# Patient Record
Sex: MALE | Race: BLACK OR AFRICAN AMERICAN | NOT HISPANIC OR LATINO
[De-identification: names, ages, dates, MRNs, and addresses within clinical notes are randomized per-mention and may not be internally consistent; named-entity substitution may affect disease eponyms.]

---

## 2021-07-08 PROBLEM — Z00.00 ENCOUNTER FOR PREVENTIVE HEALTH EXAMINATION: Status: ACTIVE | Noted: 2021-07-08

## 2021-07-12 ENCOUNTER — APPOINTMENT (OUTPATIENT)
Dept: SURGERY | Facility: CLINIC | Age: 46
End: 2021-07-12
Payer: COMMERCIAL

## 2021-07-12 VITALS
SYSTOLIC BLOOD PRESSURE: 164 MMHG | OXYGEN SATURATION: 100 % | DIASTOLIC BLOOD PRESSURE: 120 MMHG | HEART RATE: 76 BPM | WEIGHT: 172 LBS | BODY MASS INDEX: 26.07 KG/M2 | HEIGHT: 68 IN

## 2021-07-12 DIAGNOSIS — Z78.9 OTHER SPECIFIED HEALTH STATUS: ICD-10-CM

## 2021-07-12 DIAGNOSIS — Z87.891 PERSONAL HISTORY OF NICOTINE DEPENDENCE: ICD-10-CM

## 2021-07-12 DIAGNOSIS — K46.9 UNSPECIFIED ABDOMINAL HERNIA W/OUT OBSTRUCTION OR GANGRENE: ICD-10-CM

## 2021-07-12 LAB
ALBUMIN SERPL ELPH-MCNC: 5 G/DL
ALP BLD-CCNC: 68 U/L
ALT SERPL-CCNC: 31 U/L
ANION GAP SERPL CALC-SCNC: 12 MMOL/L
AST SERPL-CCNC: 32 U/L
BASOPHILS # BLD AUTO: 0.04 K/UL
BASOPHILS NFR BLD AUTO: 0.7 %
BILIRUB SERPL-MCNC: 0.4 MG/DL
BUN SERPL-MCNC: 13 MG/DL
CALCIUM SERPL-MCNC: 9.9 MG/DL
CHLORIDE SERPL-SCNC: 103 MMOL/L
CO2 SERPL-SCNC: 26 MMOL/L
CREAT SERPL-MCNC: 0.93 MG/DL
EOSINOPHIL # BLD AUTO: 0.07 K/UL
EOSINOPHIL NFR BLD AUTO: 1.3 %
GLUCOSE SERPL-MCNC: 88 MG/DL
HCT VFR BLD CALC: 45.2 %
HGB BLD-MCNC: 14.7 G/DL
IMM GRANULOCYTES NFR BLD AUTO: 0.2 %
LYMPHOCYTES # BLD AUTO: 1.83 K/UL
LYMPHOCYTES NFR BLD AUTO: 32.7 %
MAN DIFF?: NORMAL
MCHC RBC-ENTMCNC: 30.9 PG
MCHC RBC-ENTMCNC: 32.5 GM/DL
MCV RBC AUTO: 95.2 FL
MONOCYTES # BLD AUTO: 0.38 K/UL
MONOCYTES NFR BLD AUTO: 6.8 %
NEUTROPHILS # BLD AUTO: 3.27 K/UL
NEUTROPHILS NFR BLD AUTO: 58.3 %
PLATELET # BLD AUTO: 342 K/UL
POTASSIUM SERPL-SCNC: 4.6 MMOL/L
PROT SERPL-MCNC: 7.8 G/DL
RBC # BLD: 4.75 M/UL
RBC # FLD: 13.2 %
SODIUM SERPL-SCNC: 142 MMOL/L
WBC # FLD AUTO: 5.6 K/UL

## 2021-07-12 PROCEDURE — 99204 OFFICE O/P NEW MOD 45 MIN: CPT

## 2021-07-12 PROCEDURE — 99072 ADDL SUPL MATRL&STAF TM PHE: CPT

## 2021-07-12 NOTE — REASON FOR VISIT
[Consultation] : a consultation visit [Parent] : parent [FreeTextEntry1] : discomfort, right side of groin /R. Inguinal Hernia

## 2021-07-12 NOTE — HISTORY OF PRESENT ILLNESS
[de-identified] : MARK DAS is a 45 year old M who is referred to the office for consultation visit, he presents w the cc of having discomfort to the right side of groin, which has been getting worse >1 year. He feels a bulge that comes and goes. Patient states he wears a belt to the pelvic area, which helps with the discomfort. He admits to heavy lifting at times, as part of his work.

## 2021-07-12 NOTE — ASSESSMENT
[FreeTextEntry1] : Patient is a 46 y/o M who presents with cc discomfort/bulge to the right side of groin >1 year; on exam, he was found to have a right inguinal hernia.

## 2021-07-12 NOTE — REVIEW OF SYSTEMS
[Fever] : no fever [Chills] : no chills [Feeling Poorly] : not feeling poorly [Chest Pain] : no chest pain [Lower Ext Edema] : no extremity edema [Shortness Of Breath] : no shortness of breath [Cough] : no cough [Abdominal Pain] : no abdominal pain [Genital Lesion] : no genital lesions [Testicular Pain] : no testicular pain [Arthralgias] : no arthralgias [Joint Pain] : no joint pain [Skin Lesions] : no skin lesions [Skin Wound] : no skin wound [Dizziness] : no dizziness [Anxiety] : no anxiety [Muscle Weakness] : no muscle weakness [Swollen Glands] : no swollen glands [FreeTextEntry8] : discomfort, right side of groin x >1 year; bulge that comes and goes and getting larger

## 2021-07-12 NOTE — CONSULT LETTER
[Dear  ___] : Dear  [unfilled], [Consult Letter:] : I had the pleasure of evaluating your patient, [unfilled]. [Consult Closing:] : Thank you very much for allowing me to participate in the care of this patient.  If you have any questions, please do not hesitate to contact me. [Sincerely,] : Sincerely, [FreeTextEntry3] : Amado Rueda MD\par

## 2021-07-12 NOTE — PHYSICAL EXAM
[Normal Breath Sounds] : Normal breath sounds [Normal Rate and Rhythm] : normal rate and rhythm [No Rash or Lesion] : No rash or lesion [Alert] : alert [Oriented to Person] : oriented to person [Oriented to Place] : oriented to place [Oriented to Time] : oriented to time [Calm] : calm [JVD] : no jugular venous distention  [de-identified] : A/Ox3; NAD. appears comfortable [de-identified] : EOMI; sclera anicteric. Nasal mucosa pink, septum midline. Oral mucosa pink. Tongue midline, Pharynx without exudates. [de-identified] : abd is soft, NT/ND\par  [de-identified] : No penile discharge or lesions. No scrotal swelling or discoloration. Testes descended bilaterally, smooth, without masses. Epididymis non-tender. Reducible, Right Inguinal Hernia  [de-identified] : +ROM, no joint swelling

## 2021-07-23 ENCOUNTER — OUTPATIENT (OUTPATIENT)
Dept: OUTPATIENT SERVICES | Facility: HOSPITAL | Age: 46
LOS: 1 days | End: 2021-07-23
Payer: COMMERCIAL

## 2021-07-23 VITALS
RESPIRATION RATE: 16 BRPM | HEIGHT: 69 IN | WEIGHT: 167.99 LBS | DIASTOLIC BLOOD PRESSURE: 88 MMHG | TEMPERATURE: 98 F | SYSTOLIC BLOOD PRESSURE: 148 MMHG | HEART RATE: 65 BPM | OXYGEN SATURATION: 100 %

## 2021-07-23 DIAGNOSIS — K40.90 UNILATERAL INGUINAL HERNIA, WITHOUT OBSTRUCTION OR GANGRENE, NOT SPECIFIED AS RECURRENT: ICD-10-CM

## 2021-07-23 DIAGNOSIS — Z29.9 ENCOUNTER FOR PROPHYLACTIC MEASURES, UNSPECIFIED: ICD-10-CM

## 2021-07-23 DIAGNOSIS — Z01.818 ENCOUNTER FOR OTHER PREPROCEDURAL EXAMINATION: ICD-10-CM

## 2021-07-23 DIAGNOSIS — T14.8XXA OTHER INJURY OF UNSPECIFIED BODY REGION, INITIAL ENCOUNTER: Chronic | ICD-10-CM

## 2021-07-23 PROCEDURE — G0463: CPT

## 2021-07-23 NOTE — H&P PST ADULT - NSANTHOSAYNRD_GEN_A_CORE
No. KIRIT screening performed.  STOP BANG Legend: 0-2 = LOW Risk; 3-4 = INTERMEDIATE Risk; 5-8 = HIGH Risk

## 2021-07-23 NOTE — H&P PST ADULT - HISTORY OF PRESENT ILLNESS
46 yo male with no significant PMHx, reports the above.  Patient reports pain in right groin "if not wearing the belt".  He is scheduled for : Right Inguinal Hernia Repair with Mesh, on 7/28/21

## 2021-07-23 NOTE — H&P PST ADULT - NSANTHOBSERVEDRD_ENT_A_CORE
CRRT STATUS NOTE    DATA:  Time:  6:44 PM  Pressures WNL:  YES  Filter Status:  WDL    Problems Reported/Alarms Noted:  none    Supplies Present:  YES    ASSESSMENT:  Patient Net Fluid Balance:  -467 @1800  Vital Signs:  B/P: 130/78, MAPs 80s-90s on LVAD, T: 97.2, P: , R: 22  Labs:  K 3.7, Mg 2.2, Phos 3.8, ICA 4.8, Hgb 7.8, Plt 214  Goals of Therapy:  50-100cc/hr net negative as BP's allow - meeting goals of therapy    INTERVENTIONS:   TMP limit reached, restarted postponed 2/2 line placement, LIJ placed - MDs verified placement via xray. CRRT restarted.    PLAN:  Continue fluid removal as tolerated per goals of therapy.  Check filter daily.  Change filter q 72 hrs and PRN.  Please contact the CRRT resource RN at [phone] with any questions/concerns.     No

## 2021-07-23 NOTE — H&P PST ADULT - NSICDXPROBLEM_GEN_ALL_CORE_FT
PROBLEM DIAGNOSES  Problem: Unilateral inguinal hernia, without obstruction or gangrene, not specified as recurrent  Assessment and Plan: Right Inguinal Hernia Repair with Mesh    Problem: Need for prophylactic measure  Assessment and Plan: Instruction regarding chlorhexidine soap use is given.  Patient verbalized understanding.   Literature also given

## 2021-07-23 NOTE — H&P PST ADULT - DOCUMENT STATUS
Dialysis Adherence:    SW received a faxed adherence form from 's dialysis center indicates over last three months that the patient has had 1 AMAs of 19 minutes on 05/05/2020, 1 no-shows on 5/14 due to illness, and no issues with caregivers, transportation, or any other psychosocial concerns.          Form also indicates:    Last intact PTH from 06/09/2020 is reported as 55.    Current dry weight is reported as 92.5kg and Most Recent Pre-treatment weight is reported as 96.5kg on 07/07/2020.    
Authored by Resident/PA/NP

## 2021-07-24 DIAGNOSIS — Z01.818 ENCOUNTER FOR OTHER PREPROCEDURAL EXAMINATION: ICD-10-CM

## 2021-07-25 ENCOUNTER — APPOINTMENT (OUTPATIENT)
Dept: DISASTER EMERGENCY | Facility: CLINIC | Age: 46
End: 2021-07-25

## 2021-07-26 LAB — SARS-COV-2 N GENE NPH QL NAA+PROBE: NOT DETECTED

## 2021-07-27 ENCOUNTER — TRANSCRIPTION ENCOUNTER (OUTPATIENT)
Age: 46
End: 2021-07-27

## 2021-07-28 ENCOUNTER — INPATIENT (INPATIENT)
Facility: HOSPITAL | Age: 46
LOS: 1 days | Discharge: HOME | DRG: 350 | End: 2021-07-30
Attending: INTERNAL MEDICINE | Admitting: INTERNAL MEDICINE
Payer: COMMERCIAL

## 2021-07-28 ENCOUNTER — RESULT REVIEW (OUTPATIENT)
Age: 46
End: 2021-07-28

## 2021-07-28 ENCOUNTER — APPOINTMENT (OUTPATIENT)
Dept: SURGERY | Facility: HOSPITAL | Age: 46
End: 2021-07-28

## 2021-07-28 VITALS
DIASTOLIC BLOOD PRESSURE: 75 MMHG | TEMPERATURE: 99 F | OXYGEN SATURATION: 100 % | HEART RATE: 71 BPM | SYSTOLIC BLOOD PRESSURE: 126 MMHG | RESPIRATION RATE: 16 BRPM | HEIGHT: 69 IN | WEIGHT: 167.99 LBS

## 2021-07-28 DIAGNOSIS — K40.90 UNILATERAL INGUINAL HERNIA, WITHOUT OBSTRUCTION OR GANGRENE, NOT SPECIFIED AS RECURRENT: ICD-10-CM

## 2021-07-28 DIAGNOSIS — T14.8XXA OTHER INJURY OF UNSPECIFIED BODY REGION, INITIAL ENCOUNTER: Chronic | ICD-10-CM

## 2021-07-28 LAB
ALBUMIN SERPL ELPH-MCNC: 2.6 G/DL — LOW (ref 3.5–5)
ALP SERPL-CCNC: 37 U/L — LOW (ref 40–120)
ALT FLD-CCNC: 22 U/L DA — SIGNIFICANT CHANGE UP (ref 10–60)
ANION GAP SERPL CALC-SCNC: 7 MMOL/L — SIGNIFICANT CHANGE UP (ref 5–17)
ANION GAP SERPL CALC-SCNC: 9 MMOL/L — SIGNIFICANT CHANGE UP (ref 5–17)
APTT BLD: 27.7 SEC — SIGNIFICANT CHANGE UP (ref 27.5–35.5)
AST SERPL-CCNC: 23 U/L — SIGNIFICANT CHANGE UP (ref 10–40)
BASE EXCESS BLDA CALC-SCNC: -4.8 MMOL/L — LOW (ref -2–3)
BASE EXCESS BLDA CALC-SCNC: -7.1 MMOL/L — LOW (ref -2–3)
BASE EXCESS BLDA CALC-SCNC: -8.1 MMOL/L — LOW (ref -2–3)
BASE EXCESS BLDA CALC-SCNC: -8.6 MMOL/L — LOW (ref -2–3)
BASE EXCESS BLDV CALC-SCNC: -4.3 MMOL/L — SIGNIFICANT CHANGE UP
BILIRUB SERPL-MCNC: 0.3 MG/DL — SIGNIFICANT CHANGE UP (ref 0.2–1.2)
BLOOD GAS COMMENTS ARTERIAL: SIGNIFICANT CHANGE UP
BLOOD GAS COMMENTS, VENOUS: SIGNIFICANT CHANGE UP
BUN SERPL-MCNC: 14 MG/DL — SIGNIFICANT CHANGE UP (ref 7–18)
BUN SERPL-MCNC: 9 MG/DL — SIGNIFICANT CHANGE UP (ref 7–18)
CALCIUM SERPL-MCNC: 7.8 MG/DL — LOW (ref 8.4–10.5)
CALCIUM SERPL-MCNC: 8.4 MG/DL — SIGNIFICANT CHANGE UP (ref 8.4–10.5)
CHLORIDE SERPL-SCNC: 109 MMOL/L — HIGH (ref 96–108)
CHLORIDE SERPL-SCNC: 114 MMOL/L — HIGH (ref 96–108)
CK MB BLD-MCNC: 0.8 % — SIGNIFICANT CHANGE UP (ref 0–3.5)
CK MB CFR SERPL CALC: 1.1 NG/ML — SIGNIFICANT CHANGE UP (ref 0–3.6)
CK SERPL-CCNC: 134 U/L — SIGNIFICANT CHANGE UP (ref 35–232)
CO2 SERPL-SCNC: 23 MMOL/L — SIGNIFICANT CHANGE UP (ref 22–31)
CO2 SERPL-SCNC: 24 MMOL/L — SIGNIFICANT CHANGE UP (ref 22–31)
CREAT SERPL-MCNC: 0.94 MG/DL — SIGNIFICANT CHANGE UP (ref 0.5–1.3)
CREAT SERPL-MCNC: 1.04 MG/DL — SIGNIFICANT CHANGE UP (ref 0.5–1.3)
GLUCOSE SERPL-MCNC: 100 MG/DL — HIGH (ref 70–99)
GLUCOSE SERPL-MCNC: 221 MG/DL — HIGH (ref 70–99)
HCO3 BLDA-SCNC: 18 MMOL/L — LOW (ref 21–28)
HCO3 BLDA-SCNC: 19 MMOL/L — LOW (ref 21–28)
HCO3 BLDA-SCNC: 20 MMOL/L — LOW (ref 21–28)
HCO3 BLDA-SCNC: 23 MMOL/L — SIGNIFICANT CHANGE UP (ref 21–28)
HCO3 BLDV-SCNC: 23 MMOL/L — SIGNIFICANT CHANGE UP (ref 22–29)
HCT VFR BLD CALC: 47.8 % — SIGNIFICANT CHANGE UP (ref 39–50)
HGB BLD-MCNC: 15.9 G/DL — SIGNIFICANT CHANGE UP (ref 13–17)
HOROWITZ INDEX BLDA+IHG-RTO: 100 — SIGNIFICANT CHANGE UP
HOROWITZ INDEX BLDA+IHG-RTO: 100 — SIGNIFICANT CHANGE UP
HOROWITZ INDEX BLDA+IHG-RTO: 40 — SIGNIFICANT CHANGE UP
HOROWITZ INDEX BLDA+IHG-RTO: 50 — SIGNIFICANT CHANGE UP
HOROWITZ INDEX BLDV+IHG-RTO: 50 — SIGNIFICANT CHANGE UP
INR BLD: 1.15 RATIO — SIGNIFICANT CHANGE UP (ref 0.88–1.16)
LACTATE SERPL-SCNC: 2.1 MMOL/L — HIGH (ref 0.7–2)
LACTATE SERPL-SCNC: 4.8 MMOL/L — CRITICAL HIGH (ref 0.7–2)
MCHC RBC-ENTMCNC: 31.1 PG — SIGNIFICANT CHANGE UP (ref 27–34)
MCHC RBC-ENTMCNC: 33.3 GM/DL — SIGNIFICANT CHANGE UP (ref 32–36)
MCV RBC AUTO: 93.5 FL — SIGNIFICANT CHANGE UP (ref 80–100)
NRBC # BLD: 0 /100 WBCS — SIGNIFICANT CHANGE UP (ref 0–0)
PCO2 BLDA: 34 MMHG — LOW (ref 35–48)
PCO2 BLDA: 34 MMHG — LOW (ref 35–48)
PCO2 BLDA: 47 MMHG — SIGNIFICANT CHANGE UP (ref 35–48)
PCO2 BLDA: 68 MMHG — HIGH (ref 35–48)
PCO2 BLDV: 50 MMHG — SIGNIFICANT CHANGE UP (ref 42–55)
PH BLDA: 7.13 — CRITICAL LOW (ref 7.35–7.45)
PH BLDA: 7.22 — LOW (ref 7.35–7.45)
PH BLDA: 7.33 — LOW (ref 7.35–7.45)
PH BLDA: 7.37 — SIGNIFICANT CHANGE UP (ref 7.35–7.45)
PH BLDV: 7.27 — LOW (ref 7.32–7.43)
PLATELET # BLD AUTO: 270 K/UL — SIGNIFICANT CHANGE UP (ref 150–400)
PO2 BLDA: 171 MMHG — HIGH (ref 83–108)
PO2 BLDA: 217 MMHG — HIGH (ref 83–108)
PO2 BLDA: 272 MMHG — HIGH (ref 83–108)
PO2 BLDA: 328 MMHG — HIGH (ref 83–108)
PO2 BLDV: 40 MMHG — SIGNIFICANT CHANGE UP
POTASSIUM SERPL-MCNC: 3.2 MMOL/L — LOW (ref 3.5–5.3)
POTASSIUM SERPL-MCNC: 3.7 MMOL/L — SIGNIFICANT CHANGE UP (ref 3.5–5.3)
POTASSIUM SERPL-SCNC: 3.2 MMOL/L — LOW (ref 3.5–5.3)
POTASSIUM SERPL-SCNC: 3.7 MMOL/L — SIGNIFICANT CHANGE UP (ref 3.5–5.3)
PROT SERPL-MCNC: 5.2 G/DL — LOW (ref 6–8.3)
PROTHROM AB SERPL-ACNC: 13.6 SEC — SIGNIFICANT CHANGE UP (ref 10.6–13.6)
RBC # BLD: 5.11 M/UL — SIGNIFICANT CHANGE UP (ref 4.2–5.8)
RBC # FLD: 12.3 % — SIGNIFICANT CHANGE UP (ref 10.3–14.5)
SAO2 % BLDA: 100 % — SIGNIFICANT CHANGE UP
SAO2 % BLDA: 100 % — SIGNIFICANT CHANGE UP
SAO2 % BLDA: 99 % — SIGNIFICANT CHANGE UP
SAO2 % BLDA: 99 % — SIGNIFICANT CHANGE UP
SAO2 % BLDV: 68.4 % — SIGNIFICANT CHANGE UP
SODIUM SERPL-SCNC: 142 MMOL/L — SIGNIFICANT CHANGE UP (ref 135–145)
SODIUM SERPL-SCNC: 144 MMOL/L — SIGNIFICANT CHANGE UP (ref 135–145)
TROPONIN I SERPL-MCNC: <0.015 NG/ML — SIGNIFICANT CHANGE UP (ref 0–0.04)
WBC # BLD: 3.53 K/UL — LOW (ref 3.8–10.5)
WBC # FLD AUTO: 3.53 K/UL — LOW (ref 3.8–10.5)

## 2021-07-28 PROCEDURE — 88302 TISSUE EXAM BY PATHOLOGIST: CPT | Mod: 26

## 2021-07-28 PROCEDURE — 71045 X-RAY EXAM CHEST 1 VIEW: CPT | Mod: 26

## 2021-07-28 RX ORDER — HYDROMORPHONE HYDROCHLORIDE 2 MG/ML
1 INJECTION INTRAMUSCULAR; INTRAVENOUS; SUBCUTANEOUS
Refills: 0 | Status: DISCONTINUED | OUTPATIENT
Start: 2021-07-30 | End: 2021-07-30

## 2021-07-28 RX ORDER — CHLORHEXIDINE GLUCONATE 213 G/1000ML
15 SOLUTION TOPICAL EVERY 12 HOURS
Refills: 0 | Status: DISCONTINUED | OUTPATIENT
Start: 2021-07-28 | End: 2021-07-29

## 2021-07-28 RX ORDER — SODIUM CHLORIDE 9 MG/ML
500 INJECTION INTRAMUSCULAR; INTRAVENOUS; SUBCUTANEOUS ONCE
Refills: 0 | Status: DISCONTINUED | OUTPATIENT
Start: 2021-07-28 | End: 2021-07-28

## 2021-07-28 RX ORDER — DEXMEDETOMIDINE HYDROCHLORIDE IN 0.9% SODIUM CHLORIDE 4 UG/ML
0.7 INJECTION INTRAVENOUS
Qty: 400 | Refills: 0 | Status: DISCONTINUED | OUTPATIENT
Start: 2021-07-28 | End: 2021-07-28

## 2021-07-28 RX ORDER — SODIUM CHLORIDE 9 MG/ML
3 INJECTION INTRAMUSCULAR; INTRAVENOUS; SUBCUTANEOUS EVERY 8 HOURS
Refills: 0 | Status: DISCONTINUED | OUTPATIENT
Start: 2021-07-28 | End: 2021-07-28

## 2021-07-28 RX ORDER — MIDAZOLAM HYDROCHLORIDE 1 MG/ML
2 INJECTION, SOLUTION INTRAMUSCULAR; INTRAVENOUS ONCE
Refills: 0 | Status: DISCONTINUED | OUTPATIENT
Start: 2021-07-28 | End: 2021-07-28

## 2021-07-28 RX ORDER — ENOXAPARIN SODIUM 100 MG/ML
40 INJECTION SUBCUTANEOUS DAILY
Refills: 0 | Status: DISCONTINUED | OUTPATIENT
Start: 2021-07-28 | End: 2021-07-30

## 2021-07-28 RX ORDER — HYDROMORPHONE HYDROCHLORIDE 2 MG/ML
0.5 INJECTION INTRAMUSCULAR; INTRAVENOUS; SUBCUTANEOUS
Refills: 0 | Status: DISCONTINUED | OUTPATIENT
Start: 2021-07-30 | End: 2021-07-30

## 2021-07-28 RX ORDER — SODIUM CHLORIDE 9 MG/ML
1000 INJECTION, SOLUTION INTRAVENOUS
Refills: 0 | Status: DISCONTINUED | OUTPATIENT
Start: 2021-07-28 | End: 2021-07-29

## 2021-07-28 RX ORDER — SODIUM CHLORIDE 9 MG/ML
1000 INJECTION, SOLUTION INTRAVENOUS ONCE
Refills: 0 | Status: COMPLETED | OUTPATIENT
Start: 2021-07-28 | End: 2021-07-28

## 2021-07-28 RX ORDER — PROPOFOL 10 MG/ML
20 INJECTION, EMULSION INTRAVENOUS
Qty: 1000 | Refills: 0 | Status: DISCONTINUED | OUTPATIENT
Start: 2021-07-28 | End: 2021-07-29

## 2021-07-28 RX ORDER — NOREPINEPHRINE BITARTRATE/D5W 8 MG/250ML
0.05 PLASTIC BAG, INJECTION (ML) INTRAVENOUS
Qty: 16 | Refills: 0 | Status: DISCONTINUED | OUTPATIENT
Start: 2021-07-28 | End: 2021-07-29

## 2021-07-28 RX ADMIN — PROPOFOL 9.14 MICROGRAM(S)/KG/MIN: 10 INJECTION, EMULSION INTRAVENOUS at 23:35

## 2021-07-28 RX ADMIN — ENOXAPARIN SODIUM 40 MILLIGRAM(S): 100 INJECTION SUBCUTANEOUS at 18:13

## 2021-07-28 RX ADMIN — CHLORHEXIDINE GLUCONATE 15 MILLILITER(S): 213 SOLUTION TOPICAL at 17:31

## 2021-07-28 RX ADMIN — DEXMEDETOMIDINE HYDROCHLORIDE IN 0.9% SODIUM CHLORIDE 13.3 MICROGRAM(S)/KG/HR: 4 INJECTION INTRAVENOUS at 17:25

## 2021-07-28 RX ADMIN — PROPOFOL 9.14 MICROGRAM(S)/KG/MIN: 10 INJECTION, EMULSION INTRAVENOUS at 18:13

## 2021-07-28 RX ADMIN — PROPOFOL 9.14 MICROGRAM(S)/KG/MIN: 10 INJECTION, EMULSION INTRAVENOUS at 18:09

## 2021-07-28 RX ADMIN — Medication 3.57 MICROGRAM(S)/KG/MIN: at 17:27

## 2021-07-28 RX ADMIN — MIDAZOLAM HYDROCHLORIDE 2 MILLIGRAM(S): 1 INJECTION, SOLUTION INTRAMUSCULAR; INTRAVENOUS at 17:01

## 2021-07-28 RX ADMIN — SODIUM CHLORIDE 1000 MILLILITER(S): 9 INJECTION, SOLUTION INTRAVENOUS at 19:55

## 2021-07-28 RX ADMIN — SODIUM CHLORIDE 125 MILLILITER(S): 9 INJECTION, SOLUTION INTRAVENOUS at 17:45

## 2021-07-28 NOTE — H&P ADULT - ASSESSMENT
46 y/o M with PMH of right arm femur fracture and PSH of Laparotomy admitted to the hospital for Hernia repair. Surgeon was doing Inguinal hernia with mesh repair. When they were suturing the mesh, Systolic Blood pressure dropped to 40. So they used staples to close the mesh.  He was given 3L of fluids. He was started on Pressors and RIJ, Arterial line was placed emergently. POCUS at the bedside was performed and there was no PE and Left ventricle was with hyperdynamic circulation. RIJ was collapsed. His blood pressure was improving. He is transferred to ICU for Hemodynamic monitoring.     #Hypovolemic shock   #Acute Hypercapnic Respiratory Failure   #Inguinal Hernia repair with mesh POD #0  #Lactic acidosis  #Hypokalemia        CNS   Started on propofol  Pain management with Fentanyl  =====================[CVS ]===============================  Hypovolemic shock with SBP of 40.   Most likely due to dehydration and poor oral intake due to surgery.  s/p 4L of NS and BP is improving  Will obtain EKG  Trop x 1 negative, f/u T2  F/U ECHO  Will start on maintenance fluids with LR @125.  Started on Levophed, Phenylephrine, Vasopressin  Will discontinue vasopressin and taper down vasopressors to maintain MAP of 65.  =====================[RESP ]==============================  # Acute Hypercapnic Respiratory Failure   f/u CXR   ABG showed ph 7.1, Elevated PCO2   s/p Bicarb push  - Mechanical ventilation with lung protective strategy   -f/u sputum cultures, blood cultures  Less likely infectious and holding off on antibiotics for now  Aspiration precautions    =====================[ GI ]================================  # Inguinal Hernia repair with mesh POD #0  NPO for now    -====================[ RENAL ]=============================   # Lactic acidosis with lactate of 4.8.  Most likely due to Hypovolemia.   s/p Whatley  Will repeat Lactate   monitor urine output-    Hypokalemia  K is 3.2  Will give 20 meq iv  x 3 doses  Will repeat K again    =====================[ ID ]================================  no issues      ===================[ HEME/ONC ]===========================  # no issues   =====================[ ENDO ]=============================  # Blood Glucose is 221  Target is <180  started on ISS      MUSCULOSKELETAL   no issues    SKIN  # no issues    ==================[ PROPHYLAXIS ]==========================   # Dvt : Lovenox sc   # Gi : Protonix     ====================[ DISPO ]==============================   - Monitor in ICU   GOC Full code   46 y/o M with PMH of right arm femur fracture and PSH of Laparotomy admitted to the hospital for Hernia repair. Surgeon was doing Inguinal hernia with mesh repair. When they were suturing the mesh, Systolic Blood pressure dropped to 40. So they used staples to close the mesh.  He was given 3L of fluids. He was started on Pressors and RIJ, Arterial line was placed emergently. POCUS at the bedside was performed and there was no PE and Left ventricle was with hyperdynamic circulation. RIJ was collapsed. His blood pressure was improving. He is transferred to ICU for Hemodynamic monitoring.     #Hypovolemic shock   #Acute Hypercapnic Respiratory Failure   #Inguinal Hernia repair with mesh POD #0  #Lactic acidosis  #Hypokalemia        CNS   Started on precedex  =====================[CVS ]===============================  Hypovolemic shock with SBP of 40.   Most likely due to dehydration and poor oral intake due to surgery.  s/p 4L of NS and BP is improving  Will obtain EKG  Trop x 1 negative, f/u T2  F/U ECHO  Will start on maintenance fluids with LR @125.  Started on Levophed, Phenylephrine, Vasopressin  Will discontinue vasopressin and taper down vasopressors to maintain MAP of 65.  Cardio Dr Robert is consulted.  =====================[RESP ]==============================  # Acute Hypercapnic Respiratory Failure   f/u CXR   ABG showed ph 7.1, Elevated PCO2   s/p Bicarb push  - Mechanical ventilation with lung protective strategy   -f/u sputum cultures, blood cultures  Less likely infectious and holding off on antibiotics for now  Aspiration precautions    =====================[ GI ]================================  # Inguinal Hernia repair with mesh POD #0  NPO for now    -====================[ RENAL ]=============================   # Lactic acidosis with lactate of 4.8.  Most likely due to Hypovolemia.   s/p Whatley  Will repeat Lactate   monitor urine output-    Hypokalemia  K is 3.2  Will give 20 meq iv  x 3 doses  Will repeat K again    =====================[ ID ]================================  no issues      ===================[ HEME/ONC ]===========================  # no issues   =====================[ ENDO ]=============================  # Blood Glucose is 221  Target is <180  started on ISS      MUSCULOSKELETAL   no issues    SKIN  # no issues    ==================[ PROPHYLAXIS ]==========================   # Dvt : Lovenox sc   # Gi : Protonix     ====================[ DISPO ]==============================   - Monitor in ICU   GOC Full code

## 2021-07-28 NOTE — H&P ADULT - ATTENDING COMMENTS
Assessment:  1. Shock - distributive vs. hypovolemic  2. Acute hypercapnic respiratory failure  3. Right inguinal hernia - s/p mesh repair   4. Lactic acidosis  5. Hypokalemia     Plan  - Patient seen in the OR. POCUS performed showing hyperdynamic LV and RV with underfilling, IJ also collapsable, difficulty placing arterial line. Concern for hypovolemia, as upon pham placement minimal urine output  - Patient was placed on vasopressor support with Phenylephrine, vasopressin, levophed. Also was given methylene blue by anesthesiology  - Was given aggressive fluid resuscitation with improvement in hemodynamics.  - Start flotrac to obtain cardiac output  - Obtain Echo  - Cardiac work up  - Does not appears to be infected, will follow cultures  - Repeat labs including lactate and ABG  - Supplement potassium   - Admit patient to ICU for close hemodynamic monitoring  - May need low dose precedex for vent synchrony   - Discussed with mother and updated about clinical findings

## 2021-07-28 NOTE — H&P ADULT - NSHPLABSRESULTS_GEN_ALL_CORE
LABS:                        15.9   3.53  )-----------( 270      ( 28 Jul 2021 14:31 )             47.8     07-28    142  |  109<H>  |  9   ----------------------------<  221<H>  3.2<L>   |  24  |  0.94    Ca    7.8<L>      28 Jul 2021 14:32      PT/INR - ( 28 Jul 2021 14:33 )   PT: 13.6 sec;   INR: 1.15 ratio         PTT - ( 28 Jul 2021 14:33 )  PTT:27.7 sec      Lactate, Blood: 4.8 mmol/L (07-28-21 @ 14:32)

## 2021-07-28 NOTE — CHART NOTE - NSCHARTNOTEFT_GEN_A_CORE
He had a straight forward right inguinal hernia repair  Towards the end of the case his SBP, all on a sudden dropped to 40's  Anaesthesia resuscitated him.  He had A line and central line and Whatley inserted  He was transferred to ICU    Spoke to Kayla / family  who was in the building about the issues that we had in the operating room  Manager and pt advocate also came to comfort her also  Kayla's sister is very sick in the hospital in [place] and was also upset  Made her comfortable and the progress will also be given to her

## 2021-07-28 NOTE — BRIEF OPERATIVE NOTE - OPERATION/FINDINGS
inguinal hernia repair with mesh  hypotensive intraoperatively requiring pressor support, patient transferred to ICU postoperatively  wound closed with staples

## 2021-07-28 NOTE — H&P ADULT - HISTORY OF PRESENT ILLNESS
44 y/o M with PMH of right arm femur fracture and PSH of Laparotomy admitted to the hospital for Hernia repair. Surgeon was doing Inguinal hernia with mesh repair. When they were suturing the mesh, Systolic Blood pressure dropped to 40. So they used staples to close the mesh.  He was given 3L of fluids. He was started on Pressors and RIJ, Arterial line was placed emergently. POCUS at the bedside was performed and there was no PE and Left ventricle was with hyperdynamic circulation. RIJ was collapsed. His blood pressure was improving. He is transferred to ICU for Hemodynamic monitoring.  46 y/o M with PMH of right arm femur fracture and PSH of Laparotomy admitted to the hospital for Hernia repair. Surgeon was doing Inguinal hernia with mesh repair. When they were suturing the mesh, Systolic Blood pressure dropped to 40. So they used staples to close the mesh.  He was given 3L of fluids. He was started on Pressors and RIJ, Arterial line was placed emergently. POCUS at the bedside was performed and there was no PE, Left and Right ventricle was with hyperdynamic circulation. RIJ was collapsed. His blood pressure was improving. He is transferred to ICU for Hemodynamic monitoring.

## 2021-07-28 NOTE — BRIEF OPERATIVE NOTE - NSICDXBRIEFPROCEDURE_GEN_ALL_CORE_FT
PROCEDURES:  Repair, hernia, inguinal, open, using mesh, adult 28-Jul-2021 15:49:09  Delma Mcgrath

## 2021-07-28 NOTE — H&P ADULT - NSHPPHYSICALEXAM_GEN_ALL_CORE
PHYSICAL EXAM:  GENERAL: NAD, obese  HEAD:  Atraumatic, Normocephalic  EYES:  conjunctiva and sclera clear  NECK: Supple, No JVD, Normal thyroid  CHEST/LUNG: Intubated, Clear to auscultation. Clear to percussion bilaterally; No rales, rhonchi, wheezing, or rubs  HEART: Regular rate and rhythm; No murmurs, rubs, or gallops  ABDOMEN: Soft, Laparotomy scar is present; Staples are present, Bowel sounds present  NERVOUS SYSTEM:  Alert & Oriented X 0, sedated  GENITOURINARY : Whatley in place  EXTREMITIES:  2+ Peripheral Pulses, No clubbing, cyanosis, or edema  SKIN: warm dry,

## 2021-07-29 LAB
ALBUMIN SERPL ELPH-MCNC: 2.7 G/DL — LOW (ref 3.5–5)
ALP SERPL-CCNC: 36 U/L — LOW (ref 40–120)
ALT FLD-CCNC: 20 U/L DA — SIGNIFICANT CHANGE UP (ref 10–60)
ANION GAP SERPL CALC-SCNC: 5 MMOL/L — SIGNIFICANT CHANGE UP (ref 5–17)
AST SERPL-CCNC: 25 U/L — SIGNIFICANT CHANGE UP (ref 10–40)
BILIRUB SERPL-MCNC: 0.3 MG/DL — SIGNIFICANT CHANGE UP (ref 0.2–1.2)
BUN SERPL-MCNC: 16 MG/DL — SIGNIFICANT CHANGE UP (ref 7–18)
CALCIUM SERPL-MCNC: 8.1 MG/DL — LOW (ref 8.4–10.5)
CHLORIDE SERPL-SCNC: 112 MMOL/L — HIGH (ref 96–108)
CO2 SERPL-SCNC: 25 MMOL/L — SIGNIFICANT CHANGE UP (ref 22–31)
CREAT SERPL-MCNC: 1.01 MG/DL — SIGNIFICANT CHANGE UP (ref 0.5–1.3)
GLUCOSE SERPL-MCNC: 93 MG/DL — SIGNIFICANT CHANGE UP (ref 70–99)
HCT VFR BLD CALC: 43.5 % — SIGNIFICANT CHANGE UP (ref 39–50)
HGB BLD-MCNC: 14.7 G/DL — SIGNIFICANT CHANGE UP (ref 13–17)
LACTATE SERPL-SCNC: 1.4 MMOL/L — SIGNIFICANT CHANGE UP (ref 0.7–2)
MAGNESIUM SERPL-MCNC: 1.5 MG/DL — LOW (ref 1.6–2.6)
MCHC RBC-ENTMCNC: 31 PG — SIGNIFICANT CHANGE UP (ref 27–34)
MCHC RBC-ENTMCNC: 33.8 GM/DL — SIGNIFICANT CHANGE UP (ref 32–36)
MCV RBC AUTO: 91.8 FL — SIGNIFICANT CHANGE UP (ref 80–100)
MRSA PCR RESULT.: SIGNIFICANT CHANGE UP
NRBC # BLD: 0 /100 WBCS — SIGNIFICANT CHANGE UP (ref 0–0)
PHOSPHATE SERPL-MCNC: 3.4 MG/DL — SIGNIFICANT CHANGE UP (ref 2.5–4.5)
PLATELET # BLD AUTO: 271 K/UL — SIGNIFICANT CHANGE UP (ref 150–400)
POTASSIUM SERPL-MCNC: 4.8 MMOL/L — SIGNIFICANT CHANGE UP (ref 3.5–5.3)
POTASSIUM SERPL-SCNC: 4.8 MMOL/L — SIGNIFICANT CHANGE UP (ref 3.5–5.3)
PROT SERPL-MCNC: 5.3 G/DL — LOW (ref 6–8.3)
RBC # BLD: 4.74 M/UL — SIGNIFICANT CHANGE UP (ref 4.2–5.8)
RBC # FLD: 12.2 % — SIGNIFICANT CHANGE UP (ref 10.3–14.5)
S AUREUS DNA NOSE QL NAA+PROBE: SIGNIFICANT CHANGE UP
SODIUM SERPL-SCNC: 142 MMOL/L — SIGNIFICANT CHANGE UP (ref 135–145)
WBC # BLD: 13.9 K/UL — HIGH (ref 3.8–10.5)
WBC # FLD AUTO: 13.9 K/UL — HIGH (ref 3.8–10.5)

## 2021-07-29 PROCEDURE — 93306 TTE W/DOPPLER COMPLETE: CPT | Mod: 26

## 2021-07-29 PROCEDURE — 99254 IP/OBS CNSLTJ NEW/EST MOD 60: CPT | Mod: 25

## 2021-07-29 RX ORDER — MAGNESIUM SULFATE 500 MG/ML
1 VIAL (ML) INJECTION ONCE
Refills: 0 | Status: COMPLETED | OUTPATIENT
Start: 2021-07-29 | End: 2021-07-29

## 2021-07-29 RX ADMIN — Medication 100 GRAM(S): at 05:55

## 2021-07-29 RX ADMIN — ENOXAPARIN SODIUM 40 MILLIGRAM(S): 100 INJECTION SUBCUTANEOUS at 13:13

## 2021-07-29 NOTE — CHART NOTE - NSCHARTNOTEFT_GEN_A_CORE
<Hospital course>    46 y/o M with PMH of right femur fracture and PSH of Laparotomy admitted to the hospital on 7/28 for Hernia repair. Surgeon was doing Inguinal hernia with mesh repair. When they were suturing the mesh, Systolic Blood pressure dropped to 40. So they used staples to close the mesh.  He was given 3L of fluids. He was started on Pressors and RIJ, Arterial line was placed emergently. POCUS at the bedside was performed and there was no PE, Left and Right ventricle was with hyperdynamic circulation. RIJ was collapsed. His blood pressure was improving. He is transferred to ICU for Hemodynamic monitoring.       <ICU course>  On 7/29 admitted to ICU for hemodynamic monitoring. Three pressors, levophed, phenylephrine, and vasopressin were started. Patient was given another 1L bolus NS and was started on maintenance fluid LR @125. Patient remained hypotensive. Patient remained with mechanical ventilation ABG showed ph of 7.1 with elevated PCO2 s/p 1 bicarb push. Lactate was 4.8, trops were negative x2 and EKG NSR with ST abnormality. After fluids repeat Lactate was 1.4 Patient bp began to improve and vasopressin was discontinued. Overnight, pressors were discontinued, patient improved hemodynamically. Patient was extubated on morning of 7/29 and is doing well on RA. On 7/29 patient has stabilized, pham was discontinued and patient voided. Central and arterial lines were removed. Cardiology (Dr. Robert) saw the patient. Based on reported normal EF on POCUS, as well as negative cardiac enzymes and no reported cardiac risk factors, a cardiac etiology is unlikely. ST abnormalities on EKG are nonspecific and may occur in setting of hypotension, it is also unclear what his baseline EKG looks like. Reasonable to obtain echocardiogram to formally characterize LV function.  Patient is stable for downgrade.       Patient is stable for downgrade to floor under care of  ------------- for further management , covering resident ---------- was informed.    <Things to follow up>.  - Hemodynamic monitoring  - CMP, CBC  - I/O  - F/u Echocardiogram <Hospital course>    46 y/o M with PMH of right femur fracture and PSH of Laparotomy admitted to the hospital on 7/28 for Hernia repair. Surgeon was doing Inguinal hernia with mesh repair. When they were suturing the mesh, Systolic Blood pressure dropped to 40. So they used staples to close the mesh.  He was given 3L of fluids. He was started on Pressors and RIJ, Arterial line was placed emergently. POCUS at the bedside was performed and there was no PE, Left and Right ventricle was with hyperdynamic circulation. RIJ was collapsed. His blood pressure was improving. He is transferred to ICU for Hemodynamic monitoring.       <ICU course>  On 7/29 admitted to ICU for hemodynamic monitoring. Three pressors, levophed, phenylephrine, and vasopressin were started. Patient was given another 1L bolus NS and was started on maintenance fluid LR @125. Patient remained hypotensive. Patient remained with mechanical ventilation ABG showed ph of 7.1 with elevated PCO2 s/p 1 bicarb push. Lactate was 4.8, trops were negative x2 and EKG NSR with ST abnormality. After fluids repeat Lactate was 1.4 Patient bp began to improve and vasopressin was discontinued. Overnight, pressors were discontinued, patient improved hemodynamically. Patient was extubated on morning of 7/29 and is doing well on RA. On 7/29 patient has stabilized, pham was discontinued and patient voided. Central and arterial lines were removed. Cardiology (Dr. Robert) saw the patient. Based on reported normal EF on POCUS, as well as negative cardiac enzymes and no reported cardiac risk factors, a cardiac etiology is unlikely. ST abnormalities on EKG are nonspecific and may occur in setting of hypotension, it is also unclear what his baseline EKG looks like. Reasonable to obtain echocardiogram to formally characterize LV function.  Patient is stable for downgrade.       Patient is stable for downgrade to floor under care of Dr. Rueda for further management , covering resident MALKA Higuera was informed.    <Things to follow up>.  - Hemodynamic monitoring  - CMP, CBC  - I/O  - F/u Echocardiogram

## 2021-07-29 NOTE — PROGRESS NOTE ADULT - SUBJECTIVE AND OBJECTIVE BOX
INTERVAL HPI/OVERNIGHT EVENTS:  s/p R inguinal hernia repair POD#1. Pt was hypotensive intraoperatively  Seen and examined in ICU   Extubated, off pressors  No acute complaints.     MEDICATIONS  (STANDING):  enoxaparin Injectable 40 milliGRAM(s) SubCutaneous daily    Vital Signs Last 24 Hrs  T(C): 37.8 (29 Jul 2021 05:00), Max: 37.8 (29 Jul 2021 05:00)  T(F): 100 (29 Jul 2021 05:00), Max: 100 (29 Jul 2021 05:00)  HR: 83 (29 Jul 2021 10:00) (69 - 115)  BP: 106/68 (29 Jul 2021 10:00) (88/54 - 139/101)  BP(mean): 77 (29 Jul 2021 10:00) (62 - 111)  RR: 21 (29 Jul 2021 10:00) (0 - 25)  SpO2: 98% (29 Jul 2021 10:00) (92% - 100%)    Physical:  General: A&Ox3. NAD  Chest: respiration unlabored  Abdomen: Soft nondistended, nontender. dressing C/D/I    I&O's Detail    28 Jul 2021 07:01  -  29 Jul 2021 07:00  --------------------------------------------------------  IN:    Dexmedetomidine: 59.6 mL    IV PiggyBack: 100 mL    Lactated Ringers: 1875 mL    Lactated Ringers Bolus: 1000 mL    Norepinephrine: 141.2 mL    Propofol: 99.3 mL  Total IN: 3275.1 mL    OUT:    Intermittent Catheterization - Urethral (mL): 530 mL    Voided (mL): 100 mL  Total OUT: 630 mL    Total NET: 2645.1 mL    LABS:                        14.7   13.90 )-----------( 271      ( 29 Jul 2021 03:43 )             43.5             07-29    142  |  112<H>  |  16  ----------------------------<  93  4.8   |  25  |  1.01    Ca    8.1<L>      29 Jul 2021 03:43  Phos  3.4     07-29  Mg     1.5     07-29    TPro  5.3<L>  /  Alb  2.7<L>  /  TBili  0.3  /  DBili  x   /  AST  25  /  ALT  20  /  AlkPhos  36<L>  07-29

## 2021-07-29 NOTE — CHART NOTE - NSCHARTNOTEFT_GEN_A_CORE
Pt POD 0 s/p RIH repair  ICU reduced pressor support  lactate improved with iv hydration  UO^  intubated    ICU Vital Signs Last 24 Hrs  T(C): 36.7 (28 Jul 2021 23:40), Max: 37.4 (28 Jul 2021 19:35)  T(F): 98 (28 Jul 2021 23:40), Max: 99.3 (28 Jul 2021 19:35)  HR: 95 (29 Jul 2021 00:45) (69 - 115)  BP: 139/101 (29 Jul 2021 00:00) (106/78 - 139/101)  BP(mean): 111 (29 Jul 2021 00:00) (86 - 111)  ABP: 93/56 (29 Jul 2021 00:45) (66/42 - 164/94)  ABP(mean): 67 (29 Jul 2021 00:45) (0 - 111)  RR: 17 (29 Jul 2021 00:45) (0 - 24)  SpO2: 100% (29 Jul 2021 00:45) (96% - 100%)                          15.9   3.53  )-----------( 270      ( 28 Jul 2021 14:31 )             47.8   abd soft, R groin dressing CDI, no hematoma    cont care as per ICU  vent as per ICU  guarded post-op but improving

## 2021-07-29 NOTE — PROGRESS NOTE ADULT - NSPROGADDITIONALINFOA_GEN_ALL_CORE
pt seen early morning  Sitting in chair  He recovered fast  A & O x 3   wound clean  Echo today  Discussed with the pt about the hypotension  Discussed also with his mom about the progress

## 2021-07-29 NOTE — CONSULT NOTE ADULT - SUBJECTIVE AND OBJECTIVE BOX
CHIEF COMPLAINT: Low BP    HPI: 46 yo M with no pertinent medical history who presented for elective herniorrhaphy (POD #1). At the completion of the procedure, patient was noted to be hypotensive and was started on pressors. At the time, bedside US was noted to show hyperdynamic LV/RV and patient was started on fluid supplementation and transferred to ICU for further management. Patient     PAST MEDICAL & SURGICAL HISTORY:  Bone fracture (right arm, femur)    Allergies    No Known Allergies      MEDICATIONS  (STANDING):  enoxaparin Injectable 40 milliGRAM(s) SubCutaneous daily    MEDICATIONS  (PRN):      FAMILY HISTORY:  FH: hypertension (Father)      No family history of premature coronary artery disease or sudden cardiac death    SOCIAL HISTORY:  Smoking- Former smoker  Alcohol-Social  Illicit Drug use-No per chart notes    REVIEW OF SYSTEMS:  Constitutional: [ ] fever, [ ]weight loss,  [ ]fatigue  Eyes: [ ] visual changes  Respiratory: [ ]shortness of breath;  [ ] cough, [ ]wheezing, [ ]chills, [ ]hemoptysis  Cardiovascular: [ ] chest pain, [ ]palpitations, [ ]dizziness,  [ ]leg swelling [ ]syncope  Gastrointestinal: [ ] abdominal pain, [ ]nausea, [ ]vomiting,  [ ]diarrhea   Genitourinary: [ ] dysuria, [ ] hematuria  Neurologic: [ ] headaches [ ] tremors  [ ] weakness [ ] lightheadedness  Skin: [ ] itching, [ ]burning, [ ] rashes  Endocrine: [ ] heat or cold intolerance  Musculoskeletal: [ ] joint pain or swelling; [ ] muscle, back, or extremity pain  Psychiatric: [ ] depression, [ ]anxiety, [ ]mood swings, or [ ]difficulty sleeping  Hematologic: [ ] easy bruising, [ ] bleeding gums       [ x] All others negative	  [ ] Unable to obtain    Vital Signs Last 24 Hrs  T(C): 37.8 (29 Jul 2021 05:00), Max: 37.8 (29 Jul 2021 05:00)  T(F): 100 (29 Jul 2021 05:00), Max: 100 (29 Jul 2021 05:00)  HR: 87 (29 Jul 2021 07:00) (69 - 115)  BP: 97/58 (29 Jul 2021 07:00) (88/54 - 139/101)  BP(mean): 65 (29 Jul 2021 07:00) (62 - 111)  RR: 22 (29 Jul 2021 07:00) (0 - 25)  SpO2: 97% (29 Jul 2021 07:00) (92% - 100%)  I&O's Summary    28 Jul 2021 07:01  -  29 Jul 2021 07:00  --------------------------------------------------------  IN: 3275.1 mL / OUT: 630 mL / NET: 2645.1 mL        PHYSICAL EXAM:  General: No acute distress  HEENT: EOMI, PERRL  Neck: Supple, No JVD  Lungs: Clear to auscultation bilaterally; No rales or wheezing  Heart: Regular rate and rhythm; No murmurs, rubs, or gallops  Abdomen: Nontender, bowel sounds present  Extremities: No clubbing, cyanosis, or edema  Nervous system:  Alert & Oriented X3, no focal deficits  Psychiatric: Normal affect  Skin: No rashes or lesions      LABS:  07-29    142  |  112<H>  |  16  ----------------------------<  93  4.8   |  25  |  1.01    Ca    8.1<L>      29 Jul 2021 03:43  Phos  3.4     07-29  Mg     1.5     07-29    TPro  5.3<L>  /  Alb  2.7<L>  /  TBili  0.3  /  DBili  x   /  AST  25  /  ALT  20  /  AlkPhos  36<L>  07-29    Creatinine Trend: 1.01<--, 1.04<--, 0.94<--                        14.7   13.90 )-----------( 271      ( 29 Jul 2021 03:43 )             43.5     PT/INR - ( 28 Jul 2021 14:33 )   PT: 13.6 sec;   INR: 1.15 ratio         PTT - ( 28 Jul 2021 14:33 )  PTT:27.7 sec    Lipid Panel:   Cardiac Enzymes: CARDIAC MARKERS ( 28 Jul 2021 14:32 )  <0.015 ng/mL / x     / 134 U/L / x     / 1.1 ng/mL    RADIOLOGY: < from: Xray Chest 1 View-PORTABLE IMMEDIATE (Xray Chest 1 View-PORTABLE IMMEDIATE .) (07.28.21 @ 15:49) >  IMPRESSION: Tubes in place as above. Small left base infiltrate.    < end of copied text >      ECG [my interpretation]: 7/28/21 @ 15:47: Sinus rhythm, nonspecific ST abnormality     TELEMETRY:    ECHO: Pending      CHIEF COMPLAINT: Low BP    HPI: 46 yo M with no pertinent medical history who presented for elective herniorrhaphy (POD #1). At the completion of the procedure, patient was noted to be hypotensive and was started on pressors. At the time, bedside US was noted to show hyperdynamic LV/RV and patient was started on fluid supplementation and transferred to ICU for further management. Patient reports that he feels completely normal at the time of my exam. He denies any chest pain, dyspnea, palpitations, lightheadedness, or syncope. Patient denies LE edema, orthopnea, or PND. No issues with previous orthopedic surgery.    PAST MEDICAL & SURGICAL HISTORY:  Bone fracture (right arm, femur)    Allergies    No Known Allergies      MEDICATIONS  (STANDING):  enoxaparin Injectable 40 milliGRAM(s) SubCutaneous daily    MEDICATIONS  (PRN):      FAMILY HISTORY:  FH: hypertension (Father)      No family history of premature coronary artery disease or sudden cardiac death    SOCIAL HISTORY:  Smoking- Former smoker  Alcohol-Social  Illicit Drug use-No per chart notes    REVIEW OF SYSTEMS:  Constitutional: [ ] fever, [ ]weight loss,  [ ]fatigue  Eyes: [ ] visual changes  Respiratory: [ ]shortness of breath;  [ ] cough, [ ]wheezing, [ ]chills, [ ]hemoptysis  Cardiovascular: [ ] chest pain, [ ]palpitations, [ ]dizziness,  [ ]leg swelling [ ]syncope  Gastrointestinal: [ ] abdominal pain, [ ]nausea, [ ]vomiting,  [ ]diarrhea   Genitourinary: [ ] dysuria, [ ] hematuria  Neurologic: [ ] headaches [ ] tremors  [ ] weakness [ ] lightheadedness  Skin: [ ] itching, [ ]burning, [ ] rashes  Endocrine: [ ] heat or cold intolerance  Musculoskeletal: [ ] joint pain or swelling; [ ] muscle, back, or extremity pain  Psychiatric: [ ] depression, [ ]anxiety, [ ]mood swings, or [ ]difficulty sleeping  Hematologic: [ ] easy bruising, [ ] bleeding gums       [ x] All others negative	  [ ] Unable to obtain    Vital Signs Last 24 Hrs  T(C): 37.8 (29 Jul 2021 05:00), Max: 37.8 (29 Jul 2021 05:00)  T(F): 100 (29 Jul 2021 05:00), Max: 100 (29 Jul 2021 05:00)  HR: 87 (29 Jul 2021 07:00) (69 - 115)  BP: 97/58 (29 Jul 2021 07:00) (88/54 - 139/101)  BP(mean): 65 (29 Jul 2021 07:00) (62 - 111)  RR: 22 (29 Jul 2021 07:00) (0 - 25)  SpO2: 97% (29 Jul 2021 07:00) (92% - 100%)  I&O's Summary    28 Jul 2021 07:01  -  29 Jul 2021 07:00  --------------------------------------------------------  IN: 3275.1 mL / OUT: 630 mL / NET: 2645.1 mL        PHYSICAL EXAM:  General: No acute distress  HEENT: EOMI, PERRL  Neck: Supple, No JVD  Lungs: Clear to auscultation bilaterally; No rales or wheezing  Heart: Regular rate and rhythm; No murmurs, rubs, or gallops  Abdomen: Nontender, bowel sounds present  Extremities: No clubbing, cyanosis, or edema  Nervous system:  Alert & Oriented X3, no focal deficits  Psychiatric: Normal affect  Skin: No rashes or lesions      LABS:  07-29    142  |  112<H>  |  16  ----------------------------<  93  4.8   |  25  |  1.01    Ca    8.1<L>      29 Jul 2021 03:43  Phos  3.4     07-29  Mg     1.5     07-29    TPro  5.3<L>  /  Alb  2.7<L>  /  TBili  0.3  /  DBili  x   /  AST  25  /  ALT  20  /  AlkPhos  36<L>  07-29    Creatinine Trend: 1.01<--, 1.04<--, 0.94<--                        14.7   13.90 )-----------( 271      ( 29 Jul 2021 03:43 )             43.5     PT/INR - ( 28 Jul 2021 14:33 )   PT: 13.6 sec;   INR: 1.15 ratio         PTT - ( 28 Jul 2021 14:33 )  PTT:27.7 sec    Lipid Panel:   Cardiac Enzymes: CARDIAC MARKERS ( 28 Jul 2021 14:32 )  <0.015 ng/mL / x     / 134 U/L / x     / 1.1 ng/mL    RADIOLOGY: < from: Xray Chest 1 View-PORTABLE IMMEDIATE (Xray Chest 1 View-PORTABLE IMMEDIATE .) (07.28.21 @ 15:49) >  IMPRESSION: Tubes in place as above. Small left base infiltrate.    < end of copied text >      ECG [my interpretation]: 7/28/21 @ 15:47: Sinus rhythm, nonspecific ST abnormality     TELEMETRY: Sinus rhythm, no events     ECHO:  < from: Transthoracic Echocardiogram (07.29.21 @ 15:22) >  CONCLUSIONS:  1. Normal mitral valve. Trace mitral regurgitation.  2. Normal trileaflet aortic valve. No aortic stenosis. No  aortic valve regurgitation seen.  3. Normal aortic root.  4. Normal left atrium.  5. Normal leftventricular internal dimensions and wall  thicknesses.  6. Normal Left Ventricular Systolic Function,  (EF = 60 to  65% by visual estimation). No regional wall motion  abnormalities.  7. Normal diastolic function.  8. Mild right atrial enlargement.  9. Off axis images preclude accurate assessment of right  ventricular size. Normal RV systolic function (TAPSE 1.7  cm).  10. Normal tricuspid valve. Trace tricuspid regurgitation.  11. No pericardial effusion.    *** No previous Echo exam.    < end of copied text >

## 2021-07-29 NOTE — PROGRESS NOTE ADULT - ATTENDING COMMENTS
Assessment:  1. Shock - distributive vs. hypovolemic  2. Acute hypercapnic respiratory failure  3. Right inguinal hernia - s/p mesh repair   4. Lactic acidosis  5. Hypokalemia     Plan  - Extubated overnight with out any issues   - Obtain Echo  - Cardiac work up  - Cardiology consult  - Hemodynamically stable  - Labs unremarkable  - Supplement potassium   - OOB to chair   - DVT and stress ulcer prophylaxis  - Surgery follow up   - On regular diet  - Transfer out of ICU today with cardiac monitoring and cardiology follow up

## 2021-07-29 NOTE — PROGRESS NOTE ADULT - SUBJECTIVE AND OBJECTIVE BOX
INTERVAL HPI/OVERNIGHT EVENTS:     PRESSORS: [ ] YES [ ] NO  WHICH:    ANTIBIOTICS:                  DATE STARTED:  ANTIBIOTICS:                  DATE STARTED:  ANTIBIOTICS:                  DATE STARTED:    Antimicrobial:    Cardiovascular:    Pulmonary:    Hematalogic:  enoxaparin Injectable 40 milliGRAM(s) SubCutaneous daily    Other:      Drug Dosing Weight  Height (cm): 175.3 (28 Jul 2021 15:30)  Weight (kg): 77.7 (28 Jul 2021 15:30)  BMI (kg/m2): 25.3 (28 Jul 2021 15:30)  BSA (m2): 1.93 (28 Jul 2021 15:30)    CENTRAL LINE: [ ] YES [ ] NO  LOCATION:   DATE INSERTED:  REMOVE: [ ] YES [ ] NO  EXPLAIN:    HAMMOND: [ ] YES [ ] NO    DATE INSERTED:  REMOVE:  [ ] YES [ ] NO  EXPLAIN:    A-LINE:  [ ] YES [ ] NO  LOCATION:   DATE INSERTED:  REMOVE:  [ ] YES [ ] NO  EXPLAIN:    PMH/Social Hx/Fam Hx -reviewed admission note, no change since admission  PAST MEDICAL & SURGICAL HISTORY:  Bone fracture  right arm, femur      Heart faliure: acute [ ] chronic [ ] acute or chronic [ ] diastolic [ ] systolic [ ] combied systolic and diastolic[ ]  MAURISIO: ATN[ ] renal medullary necrosis [ ] CKD I [ ]CKDII [ ]CKD III [ ]CKD IV [ ]CKD V [ ]Other pathological lesions [ ]  Abdominal Nutrition Status: malnutrition [ ] cachexia [ ] morbid obesity/BMI=40 [ ] Supplement ordered [___________]     T(C): 37.8 (07-29-21 @ 05:00), Max: 37.8 (07-29-21 @ 05:00)  HR: 87 (07-29-21 @ 07:00)  BP: 97/58 (07-29-21 @ 07:00)  BP(mean): 65 (07-29-21 @ 07:00)  ABP: 97/60 (07-29-21 @ 07:00)  ABP(mean): 71 (07-29-21 @ 07:00)  RR: 22 (07-29-21 @ 07:00)  SpO2: 97% (07-29-21 @ 07:00)  Wt(kg): --    ABG - ( 28 Jul 2021 20:32 )  pH, Arterial: 7.37  pH, Blood: x     /  pCO2: 34    /  pO2: 217   / HCO3: 20    / Base Excess: -4.8  /  SaO2: 99                    07-28 @ 07:01  -  07-29 @ 07:00  --------------------------------------------------------  IN: 3275.1 mL / OUT: 630 mL / NET: 2645.1 mL        Mode: AC/ CMV (Assist Control/ Continuous Mandatory Ventilation)  RR (machine): 14  TV (machine): 500  FiO2: 30  PEEP: 5  ITime: 1  MAP: 10  PIP: 36      PHYSICAL EXAM:    GENERAL: [ ]NAD, [ ]well-groomed, [ ]well-developed  HEAD:  [ ]Atraumatic, [ ]Normocephalic  EYES: [ ]EOMI, [ ]PERRLA, [ ]conjunctiva and sclera clear  ENMT: [ ]No tonsillar erythema, exudates, or enlargement; [ ]Moist mucous membranes, [ ]Good dentition, [ ]No lesions  NECK: [ ]Supple, normal appearance, [ ]No JVD; [ ]Normal thyroid; [ ]Trachea midline  NERVOUS SYSTEM:  [ ]Alert & Oriented X3, [ ]Good concentration; [ ]Motor Strength 5/5 B/L upper and lower extremities; [ ]DTRs 2+ intact and symmetric  CHEST/LUNG: [ ]No chest deformity; [ ]Normal percussion bilaterally; [ ]No rales, rhonchi, wheezing; [ ]Crackles at bases  HEART: [ ]Regular rate and rhythm; [ ]No murmurs, rubs, or gallops  ABDOMEN: [ ]Soft, Nontender, Nondistended; [ ]Bowel sounds present  EXTREMITIES:  [ ]2+ Peripheral Pulses, [ ]No clubbing, cyanosis, or edema [ ]Bilat lower extremity edema  LYMPH: [ ]No lymphadenopathy noted  SKIN: [ ]No rashes or lesions; [ ]Good capillary refill      LABS:  CBC Full  -  ( 29 Jul 2021 03:43 )  WBC Count : 13.90 K/uL  RBC Count : 4.74 M/uL  Hemoglobin : 14.7 g/dL  Hematocrit : 43.5 %  Platelet Count - Automated : 271 K/uL  Mean Cell Volume : 91.8 fl  Mean Cell Hemoglobin : 31.0 pg  Mean Cell Hemoglobin Concentration : 33.8 gm/dL  Auto Neutrophil # : x  Auto Lymphocyte # : x  Auto Monocyte # : x  Auto Eosinophil # : x  Auto Basophil # : x  Auto Neutrophil % : x  Auto Lymphocyte % : x  Auto Monocyte % : x  Auto Eosinophil % : x  Auto Basophil % : x    07-29    142  |  112<H>  |  16  ----------------------------<  93  4.8   |  25  |  1.01    Ca    8.1<L>      29 Jul 2021 03:43  Phos  3.4     07-29  Mg     1.5     07-29    TPro  5.3<L>  /  Alb  2.7<L>  /  TBili  0.3  /  DBili  x   /  AST  25  /  ALT  20  /  AlkPhos  36<L>  07-29    PT/INR - ( 28 Jul 2021 14:33 )   PT: 13.6 sec;   INR: 1.15 ratio         PTT - ( 28 Jul 2021 14:33 )  PTT:27.7 sec        RADIOLOGY & ADDITIONAL STUDIES REVIEWED:      [ ]GOALS OF CARE DISCUSSION WITH PATIENT/FAMILY/PROXY:    CRITICAL CARE TIME SPENT: 35 minutes INTERVAL HPI/OVERNIGHT EVENTS: 46 y/o M with PMH of right arm femur fracture and PSH of Laparotomy admitted to the hospital for Hernia repair. Surgeon was doing Inguinal hernia with mesh repair. When they were suturing the mesh, Systolic Blood pressure dropped to 40. So they used staples to close the mesh.  He was given 3L of fluids. He was started on Pressors and RIJ, Arterial line was placed emergently. POCUS at the bedside was performed and there was no PE, Left and Right ventricle was with hyperdynamic circulation. RIJ was collapsed. His blood pressure was improving. He is transferred to ICU for Hemodynamic monitoring.     PRESSORS: [ ] YES [ ] NO  WHICH:    ANTIBIOTICS:                  DATE STARTED:  ANTIBIOTICS:                  DATE STARTED:  ANTIBIOTICS:                  DATE STARTED:    Antimicrobial:    Cardiovascular:    Pulmonary:    Hematalogic:  enoxaparin Injectable 40 milliGRAM(s) SubCutaneous daily    Other:      Drug Dosing Weight  Height (cm): 175.3 (28 Jul 2021 15:30)  Weight (kg): 77.7 (28 Jul 2021 15:30)  BMI (kg/m2): 25.3 (28 Jul 2021 15:30)  BSA (m2): 1.93 (28 Jul 2021 15:30)    CENTRAL LINE: [ ] YES [ ] NO  LOCATION:   DATE INSERTED:  REMOVE: [ ] YES [ ] NO  EXPLAIN:    HAMMOND: [ ] YES [ ] NO    DATE INSERTED:  REMOVE:  [ ] YES [ ] NO  EXPLAIN:    A-LINE:  [ ] YES [ ] NO  LOCATION:   DATE INSERTED:  REMOVE:  [ ] YES [ ] NO  EXPLAIN:    PMH/Social Hx/Fam Hx -reviewed admission note, no change since admission  PAST MEDICAL & SURGICAL HISTORY:  Bone fracture  right arm, femur      Heart faliure: acute [ ] chronic [ ] acute or chronic [ ] diastolic [ ] systolic [ ] combied systolic and diastolic[ ]  MAURISIO: ATN[ ] renal medullary necrosis [ ] CKD I [ ]CKDII [ ]CKD III [ ]CKD IV [ ]CKD V [ ]Other pathological lesions [ ]  Abdominal Nutrition Status: malnutrition [ ] cachexia [ ] morbid obesity/BMI=40 [ ] Supplement ordered [___________]     T(C): 37.8 (07-29-21 @ 05:00), Max: 37.8 (07-29-21 @ 05:00)  HR: 87 (07-29-21 @ 07:00)  BP: 97/58 (07-29-21 @ 07:00)  BP(mean): 65 (07-29-21 @ 07:00)  ABP: 97/60 (07-29-21 @ 07:00)  ABP(mean): 71 (07-29-21 @ 07:00)  RR: 22 (07-29-21 @ 07:00)  SpO2: 97% (07-29-21 @ 07:00)  Wt(kg): --    ABG - ( 28 Jul 2021 20:32 )  pH, Arterial: 7.37  pH, Blood: x     /  pCO2: 34    /  pO2: 217   / HCO3: 20    / Base Excess: -4.8  /  SaO2: 99                    07-28 @ 07:01  -  07-29 @ 07:00  --------------------------------------------------------  IN: 3275.1 mL / OUT: 630 mL / NET: 2645.1 mL        Mode: AC/ CMV (Assist Control/ Continuous Mandatory Ventilation)  RR (machine): 14  TV (machine): 500  FiO2: 30  PEEP: 5  ITime: 1  MAP: 10  PIP: 36      PHYSICAL EXAM:    GENERAL: [ ]NAD, [ ]well-groomed, [ ]well-developed  HEAD:  [ ]Atraumatic, [ ]Normocephalic  EYES: [ ]EOMI, [ ]PERRLA, [ ]conjunctiva and sclera clear  ENMT: [ ]No tonsillar erythema, exudates, or enlargement; [ ]Moist mucous membranes, [ ]Good dentition, [ ]No lesions  NECK: [ ]Supple, normal appearance, [ ]No JVD; [ ]Normal thyroid; [ ]Trachea midline  NERVOUS SYSTEM:  [ ]Alert & Oriented X3, [ ]Good concentration; [ ]Motor Strength 5/5 B/L upper and lower extremities; [ ]DTRs 2+ intact and symmetric  CHEST/LUNG: [ ]No chest deformity; [ ]Normal percussion bilaterally; [ ]No rales, rhonchi, wheezing; [ ]Crackles at bases  HEART: [ ]Regular rate and rhythm; [ ]No murmurs, rubs, or gallops  ABDOMEN: [ ]Soft, Nontender, Nondistended; [ ]Bowel sounds present  EXTREMITIES:  [ ]2+ Peripheral Pulses, [ ]No clubbing, cyanosis, or edema [ ]Bilat lower extremity edema  LYMPH: [ ]No lymphadenopathy noted  SKIN: [ ]No rashes or lesions; [ ]Good capillary refill      LABS:  CBC Full  -  ( 29 Jul 2021 03:43 )  WBC Count : 13.90 K/uL  RBC Count : 4.74 M/uL  Hemoglobin : 14.7 g/dL  Hematocrit : 43.5 %  Platelet Count - Automated : 271 K/uL  Mean Cell Volume : 91.8 fl  Mean Cell Hemoglobin : 31.0 pg  Mean Cell Hemoglobin Concentration : 33.8 gm/dL  Auto Neutrophil # : x  Auto Lymphocyte # : x  Auto Monocyte # : x  Auto Eosinophil # : x  Auto Basophil # : x  Auto Neutrophil % : x  Auto Lymphocyte % : x  Auto Monocyte % : x  Auto Eosinophil % : x  Auto Basophil % : x    07-29    142  |  112<H>  |  16  ----------------------------<  93  4.8   |  25  |  1.01    Ca    8.1<L>      29 Jul 2021 03:43  Phos  3.4     07-29  Mg     1.5     07-29    TPro  5.3<L>  /  Alb  2.7<L>  /  TBili  0.3  /  DBili  x   /  AST  25  /  ALT  20  /  AlkPhos  36<L>  07-29    PT/INR - ( 28 Jul 2021 14:33 )   PT: 13.6 sec;   INR: 1.15 ratio         PTT - ( 28 Jul 2021 14:33 )  PTT:27.7 sec        RADIOLOGY & ADDITIONAL STUDIES REVIEWED:      [ ]GOALS OF CARE DISCUSSION WITH PATIENT/FAMILY/PROXY:    CRITICAL CARE TIME SPENT: 35 minutes INTERVAL HPI/OVERNIGHT EVENTS: 46 y/o M with PMH of right arm femur fracture and PSH of Laparotomy admitted to the hospital for Hernia repair. Surgeon was doing Inguinal hernia with mesh repair. When they were suturing the mesh, Systolic Blood pressure dropped to 40. So they used staples to close the mesh.  He was given 3L of fluids. He was started on Pressors and RIJ, Arterial line was placed emergently. POCUS at the bedside was performed and there was no PE, Left and Right ventricle was with hyperdynamic circulation. RIJ was collapsed. His blood pressure was improving. He is transferred to ICU for Hemodynamic monitoring.   No acute events overnight. Patient extubated and is on NC saturating well.   PRESSORS: [ ] YES [X] NO  WHICH:    ANTIBIOTICS:                  DATE STARTED:  ANTIBIOTICS:                  DATE STARTED:  ANTIBIOTICS:                  DATE STARTED:    Antimicrobial:    Cardiovascular:    Pulmonary:    Hematalogic:  enoxaparin Injectable 40 milliGRAM(s) SubCutaneous daily    Other:      Drug Dosing Weight  Height (cm): 175.3 (28 Jul 2021 15:30)  Weight (kg): 77.7 (28 Jul 2021 15:30)  BMI (kg/m2): 25.3 (28 Jul 2021 15:30)  BSA (m2): 1.93 (28 Jul 2021 15:30)    CENTRAL LINE: [ ] YES [X ] NO  LOCATION:   DATE INSERTED:  REMOVE: [ ] YES [ ] NO  EXPLAIN:    HAMMOND: [ ] YES [X ] NO    DATE INSERTED:  REMOVE:  [ ] YES [ ] NO  EXPLAIN:    A-LINE:  [ ] YES [X ] NO  LOCATION:   DATE INSERTED:  REMOVE:  [ ] YES [ ] NO  EXPLAIN:    PMH/Social Hx/Fam Hx -reviewed admission note, no change since admission  PAST MEDICAL & SURGICAL HISTORY:  Bone fracture  right arm, femur      T(C): 37.8 (07-29-21 @ 05:00), Max: 37.8 (07-29-21 @ 05:00)  HR: 87 (07-29-21 @ 07:00)  BP: 97/58 (07-29-21 @ 07:00)  BP(mean): 65 (07-29-21 @ 07:00)  ABP: 97/60 (07-29-21 @ 07:00)  ABP(mean): 71 (07-29-21 @ 07:00)  RR: 22 (07-29-21 @ 07:00)  SpO2: 97% (07-29-21 @ 07:00)  Wt(kg): --    ABG - ( 28 Jul 2021 20:32 )  pH, Arterial: 7.37  pH, Blood: x     /  pCO2: 34    /  pO2: 217   / HCO3: 20    / Base Excess: -4.8  /  SaO2: 99                    07-28 @ 07:01  -  07-29 @ 07:00  --------------------------------------------------------  IN: 3275.1 mL / OUT: 630 mL / NET: 2645.1 mL              PHYSICAL EXAM:    GENERAL: NAD, well-groomed, well-developed  HEAD:  Atraumatic, Normocephalic  EYES: EOMI, PERRLA, conjunctiva and sclera clear  ENMT: No tonsillar erythema, exudates, or enlargement; Moist mucous membranes, Good dentition, No lesions  NECK: Supple, normal appearance, No JVD; Trachea midline  NERVOUS SYSTEM:  Alert & Oriented X3, Good concentration; Motor Strength 5/5 B/L upper and lower extremities;  CHEST/LUNG: No chest deformity; Normal percussion bilaterally; No rales, rhonchi, wheezing  HEART: Regular rate and rhythm; No murmurs, rubs, or gallops  ABDOMEN: Soft, Nontender, Nondistended; Bowel sounds present  EXTREMITIES: 2+ Peripheral Pulses, No clubbing, cyanosis, or edema   LYMPH: No lymphadenopathy noted  SKIN: No rashes or lesions      LABS:  CBC Full  -  ( 29 Jul 2021 03:43 )  WBC Count : 13.90 K/uL  RBC Count : 4.74 M/uL  Hemoglobin : 14.7 g/dL  Hematocrit : 43.5 %  Platelet Count - Automated : 271 K/uL  Mean Cell Volume : 91.8 fl  Mean Cell Hemoglobin : 31.0 pg  Mean Cell Hemoglobin Concentration : 33.8 gm/dL  Auto Neutrophil # : x  Auto Lymphocyte # : x  Auto Monocyte # : x  Auto Eosinophil # : x  Auto Basophil # : x  Auto Neutrophil % : x  Auto Lymphocyte % : x  Auto Monocyte % : x  Auto Eosinophil % : x  Auto Basophil % : x    07-29    142  |  112<H>  |  16  ----------------------------<  93  4.8   |  25  |  1.01    Ca    8.1<L>      29 Jul 2021 03:43  Phos  3.4     07-29  Mg     1.5     07-29    TPro  5.3<L>  /  Alb  2.7<L>  /  TBili  0.3  /  DBili  x   /  AST  25  /  ALT  20  /  AlkPhos  36<L>  07-29    PT/INR - ( 28 Jul 2021 14:33 )   PT: 13.6 sec;   INR: 1.15 ratio         PTT - ( 28 Jul 2021 14:33 )  PTT:27.7 sec        RADIOLOGY & ADDITIONAL STUDIES REVIEWED:      [ ]GOALS OF CARE DISCUSSION WITH PATIENT/FAMILY/PROXY:    CRITICAL CARE TIME SPENT: 35 minutes

## 2021-07-29 NOTE — CONSULT NOTE ADULT - ASSESSMENT
44 yo M with no pertinent medical history who presented for elective herniorrhaphy (POD #1), noted to be hypotensive towards the end of procedure with reported hyperdynamic LV on POCUS.     1. Hypotension: Seems to be in setting of fluid shifts in setting of surgery/anesthesia (i.e. 3rd spacing) and likely vasoplegic shock in setting of anesthesia.   -Based on reported normal EF on POCUS, as well as negative cardiac enzymes and no reported cardiac risk factors, a cardiac etiology is unlikely  -ST abnormalities on EKG are nonspecific and may occur in setting of hypotension, it is also unclear what his baseline EKG looks like. Reasonable to obtain echocardiogram to formally characterize LV function  -Expect BP to improve with fluids, pressors, and time    ***Note that this is a preliminary note and any recommendations should NOT be carried out until this note is finalized. *** 44 yo M with no pertinent medical history who presented for elective herniorrhaphy (POD #1), noted to be hypotensive towards the end of procedure with reported hyperdynamic LV on POCUS.     1. Hypotension: Seems to be in setting of fluid shifts in setting of surgery/anesthesia (i.e. 3rd spacing) and likely vasoplegic shock in setting of anesthesia.   -Based on fairly normal echocardiogram, as well as negative cardiac enzymes and no reported cardiac risk factors, a cardiac etiology is unlikely  -ST abnormalities on EKG are nonspecific and may occur in setting of hypotension, it is also unclear what his baseline EKG looks like. Echocardiogram is essentially unremarkable (isolated RA enlargement is noted without significant TR and with normal RV function).    -Expect BP to improve with fluids, pressors, and time  -In the case of future surgery, patient should discuss with anesthesiologist re: risk of possible adverse reaction       Discussed with ICU team residents and attending Dr. Bishop.

## 2021-07-30 ENCOUNTER — TRANSCRIPTION ENCOUNTER (OUTPATIENT)
Age: 46
End: 2021-07-30

## 2021-07-30 VITALS
SYSTOLIC BLOOD PRESSURE: 114 MMHG | DIASTOLIC BLOOD PRESSURE: 71 MMHG | HEART RATE: 82 BPM | TEMPERATURE: 99 F | OXYGEN SATURATION: 98 % | RESPIRATION RATE: 18 BRPM

## 2021-07-30 LAB
ALBUMIN SERPL ELPH-MCNC: 2.6 G/DL — LOW (ref 3.5–5)
ALP SERPL-CCNC: 35 U/L — LOW (ref 40–120)
ALT FLD-CCNC: 16 U/L DA — SIGNIFICANT CHANGE UP (ref 10–60)
ANION GAP SERPL CALC-SCNC: 6 MMOL/L — SIGNIFICANT CHANGE UP (ref 5–17)
AST SERPL-CCNC: 16 U/L — SIGNIFICANT CHANGE UP (ref 10–40)
BILIRUB SERPL-MCNC: 0.5 MG/DL — SIGNIFICANT CHANGE UP (ref 0.2–1.2)
BUN SERPL-MCNC: 13 MG/DL — SIGNIFICANT CHANGE UP (ref 7–18)
CALCIUM SERPL-MCNC: 8.1 MG/DL — LOW (ref 8.4–10.5)
CHLORIDE SERPL-SCNC: 108 MMOL/L — SIGNIFICANT CHANGE UP (ref 96–108)
CO2 SERPL-SCNC: 26 MMOL/L — SIGNIFICANT CHANGE UP (ref 22–31)
COVID-19 SPIKE DOMAIN AB INTERP: NEGATIVE — SIGNIFICANT CHANGE UP
COVID-19 SPIKE DOMAIN ANTIBODY RESULT: 0.4 U/ML — SIGNIFICANT CHANGE UP
CREAT SERPL-MCNC: 0.82 MG/DL — SIGNIFICANT CHANGE UP (ref 0.5–1.3)
GLUCOSE SERPL-MCNC: 85 MG/DL — SIGNIFICANT CHANGE UP (ref 70–99)
HCT VFR BLD CALC: 33.7 % — LOW (ref 39–50)
HGB BLD-MCNC: 11.4 G/DL — LOW (ref 13–17)
MAGNESIUM SERPL-MCNC: 2 MG/DL — SIGNIFICANT CHANGE UP (ref 1.6–2.6)
MCHC RBC-ENTMCNC: 31.6 PG — SIGNIFICANT CHANGE UP (ref 27–34)
MCHC RBC-ENTMCNC: 33.8 GM/DL — SIGNIFICANT CHANGE UP (ref 32–36)
MCV RBC AUTO: 93.4 FL — SIGNIFICANT CHANGE UP (ref 80–100)
NRBC # BLD: 0 /100 WBCS — SIGNIFICANT CHANGE UP (ref 0–0)
PHOSPHATE SERPL-MCNC: 2.9 MG/DL — SIGNIFICANT CHANGE UP (ref 2.5–4.5)
PLATELET # BLD AUTO: 203 K/UL — SIGNIFICANT CHANGE UP (ref 150–400)
POTASSIUM SERPL-MCNC: 3.8 MMOL/L — SIGNIFICANT CHANGE UP (ref 3.5–5.3)
POTASSIUM SERPL-SCNC: 3.8 MMOL/L — SIGNIFICANT CHANGE UP (ref 3.5–5.3)
PROT SERPL-MCNC: 5.5 G/DL — LOW (ref 6–8.3)
RBC # BLD: 3.61 M/UL — LOW (ref 4.2–5.8)
RBC # FLD: 12.6 % — SIGNIFICANT CHANGE UP (ref 10.3–14.5)
SARS-COV-2 IGG+IGM SERPL QL IA: 0.4 U/ML — SIGNIFICANT CHANGE UP
SARS-COV-2 IGG+IGM SERPL QL IA: NEGATIVE — SIGNIFICANT CHANGE UP
SODIUM SERPL-SCNC: 140 MMOL/L — SIGNIFICANT CHANGE UP (ref 135–145)
WBC # BLD: 7.61 K/UL — SIGNIFICANT CHANGE UP (ref 3.8–10.5)
WBC # FLD AUTO: 7.61 K/UL — SIGNIFICANT CHANGE UP (ref 3.8–10.5)

## 2021-07-30 PROCEDURE — 84100 ASSAY OF PHOSPHORUS: CPT

## 2021-07-30 PROCEDURE — 86769 SARS-COV-2 COVID-19 ANTIBODY: CPT

## 2021-07-30 PROCEDURE — 88302 TISSUE EXAM BY PATHOLOGIST: CPT

## 2021-07-30 PROCEDURE — 82962 GLUCOSE BLOOD TEST: CPT

## 2021-07-30 PROCEDURE — 97161 PT EVAL LOW COMPLEX 20 MIN: CPT

## 2021-07-30 PROCEDURE — 94003 VENT MGMT INPAT SUBQ DAY: CPT

## 2021-07-30 PROCEDURE — 94002 VENT MGMT INPAT INIT DAY: CPT

## 2021-07-30 PROCEDURE — 87040 BLOOD CULTURE FOR BACTERIA: CPT

## 2021-07-30 PROCEDURE — 87640 STAPH A DNA AMP PROBE: CPT

## 2021-07-30 PROCEDURE — 93005 ELECTROCARDIOGRAM TRACING: CPT

## 2021-07-30 PROCEDURE — 83520 IMMUNOASSAY QUANT NOS NONAB: CPT

## 2021-07-30 PROCEDURE — 83605 ASSAY OF LACTIC ACID: CPT

## 2021-07-30 PROCEDURE — 80053 COMPREHEN METABOLIC PANEL: CPT

## 2021-07-30 PROCEDURE — 84484 ASSAY OF TROPONIN QUANT: CPT

## 2021-07-30 PROCEDURE — 85730 THROMBOPLASTIN TIME PARTIAL: CPT

## 2021-07-30 PROCEDURE — 85610 PROTHROMBIN TIME: CPT

## 2021-07-30 PROCEDURE — C1781: CPT

## 2021-07-30 PROCEDURE — 87641 MR-STAPH DNA AMP PROBE: CPT

## 2021-07-30 PROCEDURE — 83735 ASSAY OF MAGNESIUM: CPT

## 2021-07-30 PROCEDURE — 93306 TTE W/DOPPLER COMPLETE: CPT

## 2021-07-30 PROCEDURE — 71045 X-RAY EXAM CHEST 1 VIEW: CPT

## 2021-07-30 PROCEDURE — 82553 CREATINE MB FRACTION: CPT

## 2021-07-30 PROCEDURE — 82803 BLOOD GASES ANY COMBINATION: CPT

## 2021-07-30 PROCEDURE — 36415 COLL VENOUS BLD VENIPUNCTURE: CPT

## 2021-07-30 PROCEDURE — 80048 BASIC METABOLIC PNL TOTAL CA: CPT

## 2021-07-30 PROCEDURE — 85027 COMPLETE CBC AUTOMATED: CPT

## 2021-07-30 PROCEDURE — 82550 ASSAY OF CK (CPK): CPT

## 2021-07-30 RX ORDER — GABAPENTIN 400 MG/1
1 CAPSULE ORAL
Qty: 6 | Refills: 0
Start: 2021-07-30 | End: 2021-08-01

## 2021-07-30 RX ADMIN — HYDROMORPHONE HYDROCHLORIDE 0.5 MILLIGRAM(S): 2 INJECTION INTRAMUSCULAR; INTRAVENOUS; SUBCUTANEOUS at 05:27

## 2021-07-30 RX ADMIN — HYDROMORPHONE HYDROCHLORIDE 0.5 MILLIGRAM(S): 2 INJECTION INTRAMUSCULAR; INTRAVENOUS; SUBCUTANEOUS at 05:45

## 2021-07-30 NOTE — PHYSICAL THERAPY INITIAL EVALUATION ADULT - MODALITIES TREATMENT COMMENTS
Following outcome measures were performed: 6 Minute Walk Test = 835 ft.; Gait Speed = 0.73 m/s.  Modified Riki Dyspnea Scale: after ambulation = 4/10; after stairs: 2/10

## 2021-07-30 NOTE — PHYSICAL THERAPY INITIAL EVALUATION ADULT - DIAGNOSIS, PT EVAL
Patient displays general weakness and deconditioning from medical procedures, but has no limitations in his independent functional mobility for transfers, ambulation, and stair negotiation

## 2021-07-30 NOTE — PROGRESS NOTE ADULT - ASSESSMENT
45M s/p R inguinal hernia repair with mesh 7/28, hypotensive intraoperative   ICU for hypotensive shock  Now off pressors, extubated, down graded, VSS    -Cardiology consult appreciated, echo WNL  -Reg diet   -Pain control PRN   -DVT ppx/OOB   -D/c planning   
45M s/p R inguinal hernia repair with mesh, hypotensive intraoperative   ICU for hypotensive shock  Now off pressors, extubated    -diet as tolerated  -pain control PRN  -remainder of care per ICU 
46 y/o M with PMH of right arm femur fracture and PSH of Laparotomy admitted to the hospital for Hernia repair. Surgeon was doing Inguinal hernia with mesh repair. When they were suturing the mesh, Systolic Blood pressure dropped to 40. So they used staples to close the mesh.  He was given 3L of fluids. He was started on Pressors and RIJ, Arterial line was placed emergently. His blood pressure was improving. He is transferred to ICU for Hemodynamic monitoring.     #Hypovolemic shock   #Acute Hypercapnic Respiratory Failure   #Inguinal Hernia repair with mesh POD #0  #Lactic acidosis  #Hypokalemia        CNS  - off precedex     -[CVS ]  -Hypovolemic shock with SBP of 40.   - resolved   -Most likely due to dehydration and poor oral intake due to surgery.  s/p 4L of NS  -EKG wnl, ST segment abnormality, prior EKG unavailable   -Trop x 2 negative   -off Levophed, Phenylephrine, Vasopressin  -Cardio Dr Robert is consulted.  - F/U Echo     -[RESP ]  # Acute Hypercapnic Respiratory Failure   - resolved, extubated saturating well on RA   -f/u sputum cultures, blood cultures  -Less likely infectious and holding off on antibiotics for now    [ GI ]  # Inguinal Hernia repair with mesh POD #0  - Resume regular diet       [Renal]   #Lactic acidosis with lactate of 4.8. lactate currently 1.4   Most likely due to Hypovolemia.   -resolved    Hypokalemia  -resolved     -[ ID ]  no issues      -[ HEME/ONC ]  # no issues     -[ ENDO ]  # Blood Glucose is 93  Target is <180  started on ISS      -MUSCULOSKELETAL   no issues    -SKIN  # no issues    [ PROPHYLAXIS ]  # Dvt : Lovenox sc   # Gi : Protonix     -[ DISPO ]  - Monitor in ICU   GO Full code

## 2021-07-30 NOTE — PHYSICAL THERAPY INITIAL EVALUATION ADULT - PERTINENT HX OF CURRENT PROBLEM, REHAB EVAL
Patient is POD 2 right inguinal hernia repair, and was hypotensive intraoperatively requiring pressor support.

## 2021-07-30 NOTE — PHYSICAL THERAPY INITIAL EVALUATION ADULT - GENERAL OBSERVATIONS, REHAB EVAL
Pt c/o generalized pain 3/10. Tylenol 650 mg po prn given as ordered. Pt c/o insomnia. Least 
restrictive measures ineffective. Restoril 7.5 mg po given as ordered. Will continue to 
monitor. alert, oriented; NAD; + right peripheral IV access

## 2021-07-30 NOTE — PROGRESS NOTE ADULT - SUBJECTIVE AND OBJECTIVE BOX
INTERVAL HPI/OVERNIGHT EVENTS:  Patient seen and examined at bedside.   Pt resting comfortably. No acute complaints.   Tolerating diet.    Denies N/V    MEDICATIONS  (STANDING):  enoxaparin Injectable 40 milliGRAM(s) SubCutaneous daily    MEDICATIONS  (PRN):  HYDROmorphone  Injectable 0.5 milliGRAM(s) IV Push every 10 minutes PRN Moderate Pain (4 - 6)  HYDROmorphone  Injectable 1 milliGRAM(s) IV Push every 10 minutes PRN Severe Pain (7 - 10)      Vital Signs Last 24 Hrs  T(C): 37.1 (30 Jul 2021 05:24), Max: 37.7 (29 Jul 2021 16:11)  T(F): 98.7 (30 Jul 2021 05:24), Max: 99.8 (29 Jul 2021 16:11)  HR: 90 (30 Jul 2021 05:24) (78 - 94)  BP: 125/70 (30 Jul 2021 05:24) (106/68 - 133/71)  BP(mean): 80 (29 Jul 2021 21:15) (77 - 88)  RR: 18 (30 Jul 2021 05:24) (18 - 28)  SpO2: 99% (30 Jul 2021 05:24) (97% - 100%)    Physical:  General: A&Ox3. NAD.  Respirations: Unlabored   Abdomen: Soft nondistended, appropriate incisional tenderness, dressing removed at bedside, staples in place, wound edges well approximated, no drainage from surgical site, wound clean     I&O's Detail      LABS:                        11.4   7.61  )-----------( 203      ( 30 Jul 2021 06:04 )             33.7             07-30    140  |  108  |  13  ----------------------------<  85  3.8   |  26  |  0.82    Ca    8.1<L>      30 Jul 2021 06:04  Phos  2.9     07-30  Mg     2.0     07-30    TPro  5.5<L>  /  Alb  2.6<L>  /  TBili  0.5  /  DBili  x   /  AST  16  /  ALT  16  /  AlkPhos  35<L>  07-30

## 2021-07-30 NOTE — DISCHARGE NOTE PROVIDER - NSDCCPTREATMENT_GEN_ALL_CORE_FT
PRINCIPAL PROCEDURE  Procedure: Repair, hernia, inguinal, open, using mesh, adult  Findings and Treatment: Please follow-up with your surgeon in 1 week. Drink plenty of fluids and rest as needed. Call for any fever over 101, nausea, vomiting, severe pain, no passing of gas or bowel movement.   DIET  You may resume your regular diet as normal.   SURGICAL SITES  Remove outer dressing and keep white steri-strips in place allowing them to fall off on their own. You may shower 48 hours post-operatively but do not bathe or soak in the water for 1-2 weeks; pat dry. If you notice any signs of surgical site infection (ie. redness, swelling, pain, pus drainage), please seek medical care immediately.   ACTIVITY  Do not lift anything heavier than 10 pounds for 2 weeks and avoid strenuous activity for 4-6 weeks.   PAIN CONTROL  You may take Motrin 600mg (with food) or Tylenol 650mg as needed for mild pain. Stagger one medication 3 hours after the other for maximum pain control. Maximum daily dose of Tylenol should not exceed 4grams/day.   You may take your prescribed pain medication for severe breakthrough pain not that is not relieved by Tylenol/Motrin. Do not drive or make important decisions while taking this medication and do not take more than 4 pills in 24 hours.

## 2021-07-30 NOTE — PHYSICAL THERAPY INITIAL EVALUATION ADULT - LIVES WITH, PROFILE
sister in a private house with 3 steps to enter the front door and 12 steps to go to his second floor where his bedroom is. His mother is available to help out if needed.

## 2021-07-30 NOTE — DISCHARGE NOTE PROVIDER - NSDCCPCAREPLAN_GEN_ALL_CORE_FT
PRINCIPAL DISCHARGE DIAGNOSIS  Diagnosis: Right inguinal hernia  Assessment and Plan of Treatment:

## 2021-07-30 NOTE — PROGRESS NOTE ADULT - NSPROGADDITIONALINFOA_GEN_ALL_CORE
pt doing well  no complaints  wound clean  discussed the test results  He feels well  Had d/w him. He has h/o heavy alcohol abuse before the surgery which he said he stopped few days before  h/o also Marihuana use   will d/c home with f/u in the office

## 2021-07-30 NOTE — DISCHARGE NOTE PROVIDER - HOSPITAL COURSE
44 y/o M with PMH of right arm femur fracture and PSH of Laparotomy admitted to the hospital for Hernia repair. Surgeon was doing Inguinal hernia with mesh repair. When they were suturing the mesh, Systolic Blood pressure dropped to 40. So they used staples to close the mesh.  He was given 3L of fluids. He was started on Pressors and RIJ, Arterial line was placed emergently. No evidence of  PE, Left and Right ventricle was with hyperdynamic circulation. RIJ was collapsed. His blood pressure was improving. He is transferred to ICU for Hemodynamic monitoring.  . Pt was seen by cardiology. Pt was downgraded from ICU. Pt had echocardiogram.  < from: Transthoracic Echocardiogram (07.29.21 @ 15:22) >    CONCLUSIONS:  1. Normal mitral valve. Trace mitral regurgitation.  2. Normal trileaflet aortic valve. No aortic stenosis. No  aortic valve regurgitation seen.  3. Normal aortic root.  4. Normal left atrium.  5. Normal leftventricular internal dimensions and wall  thicknesses.  6. Normal Left Ventricular Systolic Function,  (EF = 60 to  65% by visual estimation). No regional wall motion  abnormalities.  7. Normal diastolic function.  8. Mild right atrial enlargement.  9. Off axis images preclude accurate assessment of right  ventricular size. Normal RV systolic function (TAPSE 1.7  cm).  10. Normal tricuspid valve. Trace tricuspid regurgitation.  11. No pericardial effusion.    < end of copied text >    Pt is tolerated regular diet and is stable for discharge home.

## 2021-07-30 NOTE — DISCHARGE NOTE NURSING/CASE MANAGEMENT/SOCIAL WORK - PATIENT PORTAL LINK FT
You can access the FollowMyHealth Patient Portal offered by [facility] by registering at the following website: [email or web address]. By joining [employer]’s FollowMyHealth portal, you will also be able to view your health information using other applications (apps) compatible with our system.

## 2021-07-30 NOTE — PHYSICAL THERAPY INITIAL EVALUATION ADULT - DISCHARGE DISPOSITION, PT EVAL
Patient can go home once medically stable. No additional skilled PT services required at this time./home

## 2021-07-30 NOTE — PHYSICAL THERAPY INITIAL EVALUATION ADULT - GAIT DEVIATIONS NOTED, PT EVAL
expected due to medical procedures. Jamarcus will return to PLOF with more opportunities for ambulation/mobilization./decreased jamarcus

## 2021-07-30 NOTE — DISCHARGE NOTE PROVIDER - CARE PROVIDER_API CALL
Amado Rueda)  Surgery  [address] [place], [place] [id number]  Phone: [phone]  Fax: [phone]  Established Patient  Follow Up Time: 1 week

## 2021-07-31 LAB — TRYPTASE SERPL-MCNC: 63.2 UG/L — HIGH (ref 2.2–13.2)

## 2021-08-02 LAB
CULTURE RESULTS: SIGNIFICANT CHANGE UP
CULTURE RESULTS: SIGNIFICANT CHANGE UP
SPECIMEN SOURCE: SIGNIFICANT CHANGE UP
SPECIMEN SOURCE: SIGNIFICANT CHANGE UP

## 2021-08-03 LAB — SURGICAL PATHOLOGY STUDY: SIGNIFICANT CHANGE UP

## 2021-08-12 ENCOUNTER — APPOINTMENT (OUTPATIENT)
Dept: SURGERY | Facility: CLINIC | Age: 46
End: 2021-08-12
Payer: COMMERCIAL

## 2021-08-12 VITALS — TEMPERATURE: 96.4 F

## 2021-08-12 DIAGNOSIS — K40.90 UNILATERAL INGUINAL HERNIA, W/OUT OBSTRUCTION OR GANGRENE, NOT SPECIFIED AS RECURRENT: ICD-10-CM

## 2021-08-12 PROCEDURE — 99024 POSTOP FOLLOW-UP VISIT: CPT

## 2021-08-12 NOTE — HISTORY OF PRESENT ILLNESS
[de-identified] : MARK DAS is a 45 year old male who presents to the office for post op visit today, S/P repair of right inguinal hernia with mesh 07/28/21. Patient states he's feeling well and without reported complaints. Has incision site tenderness. No swelling. No fevers/chills. No dysuria or urinary complaints.

## 2021-08-12 NOTE — PHYSICAL EXAM
[Normal Breath Sounds] : Normal breath sounds [Normal Rate and Rhythm] : normal rate and rhythm [No Rash or Lesion] : No rash or lesion [Alert] : alert [Oriented to Person] : oriented to person [Oriented to Place] : oriented to place [Oriented to Time] : oriented to time [Calm] : calm [de-identified] : A/Ox3; NAD. appears comfortable [de-identified] : staples intact, wouund healing well with no hematoma, erythema or drainage; No evidence of infection.\par \par  [de-identified] : +ROM, no joint swelling

## 2021-08-12 NOTE — REASON FOR VISIT
[Post Op: _________] : a [unfilled] post op visit [FreeTextEntry1] : S/P repair of right inguinal hernia with mesh 07/28/21

## 2021-08-12 NOTE — ASSESSMENT
[FreeTextEntry1] : MARK DAS is a 45 year old male who presents to the office for post op visit today, S/P repair of right inguinal hernia with mesh 07/28/21. Patient states he's feeling well and without reported complaints. Has incision site tenderness. No swelling. No fevers/chills. No dysuria or urinary complaints. \par \par Incision site is healing well and as expected. Staples removed. There is no evidence of infection/complication, and patient is progressing as expected. Post-operative wound care, activities, restrictions and precautions were reinforced. \par

## 2021-08-12 NOTE — CONSULT LETTER
[Dear  ___] : Dear  [unfilled], [Courtesy Letter:] : I had the pleasure of seeing your patient, [unfilled], in my office today. [Sincerely,] : Sincerely, [FreeTextEntry3] : Dr Rueda

## 2022-05-17 NOTE — PLAN
OCT  -Borderline OU. [FreeTextEntry1] : Mr. MARK DAS  was informed of significance of findings. All options, risks and benefits were discussed at length. Informed consent for repair of Right inguinal hernia, with the use of mesh, and the potential post op complications were discussed, including infection, recurrence and other related complications. \par The patient wishes to proceed with the planned surgery. We will schedule for surgery at the next available date, pending any required insurance pre-certification or pre-approval. Patient agrees to obtain any necessary pre-operative evaluations and testing prior to surgery.\par he was advised to seek immediate medical attention with any acute change in symptoms or with the development of any new or worsening symptoms. \par Patient's questions and concerns addressed to patient's satisfaction, and patient verbalized an understanding of the information discussed.\par \par \par Will schedule for the surgery at [facility] [place]. \par \par \par

## 2022-08-31 NOTE — H&P PST ADULT - BACK EXAM
Ivermectin Counseling:  Patient instructed to take medication on an empty stomach with a full glass of water.  Patient informed of potential adverse effects including but not limited to nausea, diarrhea, dizziness, itching, and swelling of the extremities or lymph nodes.  The patient verbalized understanding of the proper use and possible adverse effects of ivermectin.  All of the patient's questions and concerns were addressed. normal shape/ROM intact/strength intact

## 2022-12-06 ENCOUNTER — EMERGENCY (EMERGENCY)
Facility: HOSPITAL | Age: 47
LOS: 1 days | Discharge: HOME | End: 2022-12-06
Attending: EMERGENCY MEDICINE | Admitting: EMERGENCY MEDICINE

## 2022-12-06 VITALS — TEMPERATURE: 99 F | RESPIRATION RATE: 18 BRPM | OXYGEN SATURATION: 100 % | HEART RATE: 81 BPM

## 2022-12-06 VITALS
DIASTOLIC BLOOD PRESSURE: 103 MMHG | TEMPERATURE: 98 F | SYSTOLIC BLOOD PRESSURE: 158 MMHG | HEART RATE: 84 BPM | RESPIRATION RATE: 20 BRPM | OXYGEN SATURATION: 99 %

## 2022-12-06 DIAGNOSIS — T14.8XXA OTHER INJURY OF UNSPECIFIED BODY REGION, INITIAL ENCOUNTER: Chronic | ICD-10-CM

## 2022-12-06 LAB
ALBUMIN SERPL ELPH-MCNC: 4.8 G/DL — SIGNIFICANT CHANGE UP (ref 3.3–5)
ALP SERPL-CCNC: 55 U/L — SIGNIFICANT CHANGE UP (ref 40–120)
ALT FLD-CCNC: 17 U/L — SIGNIFICANT CHANGE UP (ref 4–41)
ANION GAP SERPL CALC-SCNC: 13 MMOL/L — SIGNIFICANT CHANGE UP (ref 7–14)
AST SERPL-CCNC: 23 U/L — SIGNIFICANT CHANGE UP (ref 4–40)
BASOPHILS # BLD AUTO: 0.02 K/UL — SIGNIFICANT CHANGE UP (ref 0–0.2)
BASOPHILS NFR BLD AUTO: 0.3 % — SIGNIFICANT CHANGE UP (ref 0–2)
BILIRUB SERPL-MCNC: 0.5 MG/DL — SIGNIFICANT CHANGE UP (ref 0.2–1.2)
BUN SERPL-MCNC: 8 MG/DL — SIGNIFICANT CHANGE UP (ref 7–23)
CALCIUM SERPL-MCNC: 9.6 MG/DL — SIGNIFICANT CHANGE UP (ref 8.4–10.5)
CHLORIDE SERPL-SCNC: 99 MMOL/L — SIGNIFICANT CHANGE UP (ref 98–107)
CO2 SERPL-SCNC: 24 MMOL/L — SIGNIFICANT CHANGE UP (ref 22–31)
CREAT SERPL-MCNC: 0.82 MG/DL — SIGNIFICANT CHANGE UP (ref 0.5–1.3)
EGFR: 111 ML/MIN/1.73M2 — SIGNIFICANT CHANGE UP
EOSINOPHIL # BLD AUTO: 0 K/UL — SIGNIFICANT CHANGE UP (ref 0–0.5)
EOSINOPHIL NFR BLD AUTO: 0 % — SIGNIFICANT CHANGE UP (ref 0–6)
GLUCOSE SERPL-MCNC: 93 MG/DL — SIGNIFICANT CHANGE UP (ref 70–99)
HCT VFR BLD CALC: 40.7 % — SIGNIFICANT CHANGE UP (ref 39–50)
HGB BLD-MCNC: 13.6 G/DL — SIGNIFICANT CHANGE UP (ref 13–17)
IANC: 5.89 K/UL — SIGNIFICANT CHANGE UP (ref 1.8–7.4)
IMM GRANULOCYTES NFR BLD AUTO: 0.3 % — SIGNIFICANT CHANGE UP (ref 0–0.9)
LYMPHOCYTES # BLD AUTO: 1.79 K/UL — SIGNIFICANT CHANGE UP (ref 1–3.3)
LYMPHOCYTES # BLD AUTO: 22.4 % — SIGNIFICANT CHANGE UP (ref 13–44)
MAGNESIUM SERPL-MCNC: 1.8 MG/DL — SIGNIFICANT CHANGE UP (ref 1.6–2.6)
MCHC RBC-ENTMCNC: 30.6 PG — SIGNIFICANT CHANGE UP (ref 27–34)
MCHC RBC-ENTMCNC: 33.4 GM/DL — SIGNIFICANT CHANGE UP (ref 32–36)
MCV RBC AUTO: 91.5 FL — SIGNIFICANT CHANGE UP (ref 80–100)
MONOCYTES # BLD AUTO: 0.27 K/UL — SIGNIFICANT CHANGE UP (ref 0–0.9)
MONOCYTES NFR BLD AUTO: 3.4 % — SIGNIFICANT CHANGE UP (ref 2–14)
NEUTROPHILS # BLD AUTO: 5.89 K/UL — SIGNIFICANT CHANGE UP (ref 1.8–7.4)
NEUTROPHILS NFR BLD AUTO: 73.6 % — SIGNIFICANT CHANGE UP (ref 43–77)
NRBC # BLD: 0 /100 WBCS — SIGNIFICANT CHANGE UP (ref 0–0)
NRBC # FLD: 0 K/UL — SIGNIFICANT CHANGE UP (ref 0–0)
PLATELET # BLD AUTO: 277 K/UL — SIGNIFICANT CHANGE UP (ref 150–400)
POTASSIUM SERPL-MCNC: 4 MMOL/L — SIGNIFICANT CHANGE UP (ref 3.5–5.3)
POTASSIUM SERPL-SCNC: 4 MMOL/L — SIGNIFICANT CHANGE UP (ref 3.5–5.3)
PROT SERPL-MCNC: 7.9 G/DL — SIGNIFICANT CHANGE UP (ref 6–8.3)
RBC # BLD: 4.45 M/UL — SIGNIFICANT CHANGE UP (ref 4.2–5.8)
RBC # FLD: 12.4 % — SIGNIFICANT CHANGE UP (ref 10.3–14.5)
SODIUM SERPL-SCNC: 136 MMOL/L — SIGNIFICANT CHANGE UP (ref 135–145)
TROPONIN T, HIGH SENSITIVITY RESULT: <6 NG/L — SIGNIFICANT CHANGE UP
TSH SERPL-MCNC: 0.65 UIU/ML — SIGNIFICANT CHANGE UP (ref 0.27–4.2)
WBC # BLD: 7.99 K/UL — SIGNIFICANT CHANGE UP (ref 3.8–10.5)
WBC # FLD AUTO: 7.99 K/UL — SIGNIFICANT CHANGE UP (ref 3.8–10.5)

## 2022-12-06 PROCEDURE — 93010 ELECTROCARDIOGRAM REPORT: CPT

## 2022-12-06 PROCEDURE — 74019 RADEX ABDOMEN 2 VIEWS: CPT | Mod: 26

## 2022-12-06 PROCEDURE — 99285 EMERGENCY DEPT VISIT HI MDM: CPT

## 2022-12-06 NOTE — ED ADULT NURSE NOTE - OBJECTIVE STATEMENT
Patient received in chair. Respirations even and unlabored. Present to ER c/o " I almost passed out" neuro intact + lightheadedness + dizziness. Denies CP or SOB. Reports drinkign alcohol every day. Last drink was 2 days ago. No s/s of ETOH withdrawal noted  IV placed Labs drawn Comfort and safety maintained. All current care needs met. Care plan continued Barbi MCGRATH

## 2022-12-06 NOTE — ED ADULT TRIAGE NOTE - CHIEF COMPLAINT QUOTE
Patient has c/o dizziness and felt like he wanted to pass out. Checked his blood pressure and it was high. Pt also has c/o trouble passing his stool. Last year June he had inguinal hernia surgery and since then he has had this change in BM.

## 2022-12-06 NOTE — ED PROVIDER NOTE - PATIENT PORTAL LINK FT
You can access the FollowMyHealth Patient Portal offered by [facility] by registering at the following website: [email or web address]. By joining [employer]’s FollowMyHealth portal, you will also be able to view your health information using other applications (apps) compatible with our system.

## 2022-12-06 NOTE — ED PROVIDER NOTE - ATTENDING CONTRIBUTION TO CARE
Patient is a 47 yo M with no chronic medical problems here for evaluation of dizziness and lightheadedness since last night. Patient states he was working in his yard and felt lightheaded afterwards. Denies chest pain, shortness of breath. He checked his blood pressure, found it to be high to 165/100. No headache. He states he couldn't sleep last night, felt anxious and checked his blood pressure again, found it to be 140/90. He denies any recent fevers, chills, nausea, vomiting, diarrhea. dysuria. He states he has had problems with constipation since his inguinal hernia repair in 2021. No headache. Denies chest pain with exertion, MCGOVERN. No leg swelling. No history of DVT/PE. No recent travel.     VS noted  Gen. no acute distress, Non toxic   HEENT: EOMI, mmm  Lungs: CTAB/L no C/ W /R   CVS: RRR   Abd; Soft non tender, non distended   Ext: no edema, no calf tenderness  Skin: no rash  Neuro AAOx3, CN 2-12 intact, strength is 5/5 in UE/LE, gait normal, clear speech  a/p: high blood pressure, lightheaded - patient with no focal exam findings. Will check ekg, labs, orthostatics. Patient c/o constipation. Patient likely needs bowel regimen. Will check abd XR. If no acute findings, plan to d/c with pcp follow up. Discussed in detail with patient.   - Micki ADAMS Patient is a 47 yo M with no chronic medical problems here for evaluation of dizziness and lightheadedness since last night. Patient states he was working in his yard and felt lightheaded afterwards. Denies chest pain, shortness of breath. He checked his blood pressure, found it to be high to 165/100. No headache. He states he couldn't sleep last night, felt anxious and checked his blood pressure again, found it to be 140/90. He denies any recent fevers, chills, nausea, vomiting, diarrhea. dysuria. He states he has had problems with constipation since his inguinal hernia repair in 2021. No headache. Denies chest pain with exertion, MCGOVERN. No leg swelling. No history of DVT/PE. No recent travel. Denies black or bloody stools.    VS noted  Gen. no acute distress, Non toxic   HEENT: EOMI, mmm  Lungs: CTAB/L no C/ W /R   CVS: RRR   Abd; Soft non tender, non distended   Ext: no edema, no calf tenderness  Skin: no rash  Neuro AAOx3, CN 2-12 intact, strength is 5/5 in UE/LE, gait normal, clear speech  a/p: high blood pressure, lightheaded - patient with no focal exam findings. Will check ekg, labs, orthostatics. Patient c/o constipation. Patient likely needs bowel regimen. Will check abd XR. If no acute findings, plan to d/c with pcp follow up. Discussed in detail with patient.   - Micki ADAMS

## 2022-12-06 NOTE — ED PROVIDER NOTE - OBJECTIVE STATEMENT
46yM w/pshx of inguinal hernia repair in June 2021 and no pmh presents for dizziness described as lightheadedness that occurred last night after he finished some gardening work. Did not think he was doing anything too strenuous, felt lightheaded and faint and decided to check his blood pressure at home which was 165/100. Pt rested, layed down and later rechecked BP which became 140/90 without any intervention with medications. Pt also complaining of constipation-related symptoms since his hernia surgery. Has some vague diffuse abdominal pain about a 4/10. Denies fevers, chills, n/v/d, dysuria.

## 2022-12-06 NOTE — ED PROVIDER NOTE - NSFOLLOWUPINSTRUCTIONS_ED_ALL_ED_FT
You were evaluated in the emergency department for dizziness. Your results were discussed with you and are attached. Please follow up with your primary care provider, a cardiologist, and a gastroenterologist (GI doctor). Clinic information is attached. You may also get a call to help make appointments. To help with your constipation, eat fiber-rich foods and you may also purchase Dulcolax over-the-counter. Saline enemas are also an option if the Dulcolax does not work.    Constipation    Constipation is when a person has fewer than three bowel movements a week, has difficulty having a bowel movement, or has stools that are dry, hard, or larger than normal. Other symptoms can include abdominal pain or bloating. As people grow older, constipation is more common. A low-fiber diet, not taking in enough fluids, and taking certain medicines, including opioid painkillers, may make constipation worse. Treatment varies but may include dietary modifications (more fiber-rich foods), lifestyle modifications, and possible medications.     SEEK IMMEDIATE MEDICAL CARE IF YOU HAVE ANY OF THE FOLLOWING SYMPTOMS: bright red blood in your stool, constipation for longer than 4 days, abdominal or rectal pain, unexplained weight loss, or inability to pass gas.      Dizziness    Dizziness can manifest as a feeling of unsteadiness or light-headedness. You may feel like you are about to faint. This condition can be caused by a number of things, including medicines, dehydration, or illness. Drink enough fluid to keep your urine clear or pale yellow. Do not drink alcohol and limit your caffeine intake. Avoid quick or sudden movements.  Rise slowly from chairs and steady yourself until you feel okay. In the morning, first sit up on the side of the bed.    SEEK IMMEDIATE MEDICAL CARE IF YOU HAVE ANY OF THE FOLLOWING SYMPTOMS: vomiting, changes in your vision or speech, weakness in your arms or legs, trouble speaking or swallowing, chest pain, abdominal pain, shortness of breath, sweating, bleeding, headache, neck pain, or fever.

## 2022-12-06 NOTE — ED PROVIDER NOTE - PHYSICAL EXAMINATION
GENERAL: well appearing in no acute distress, non-toxic appearing  HEAD: normocephalic, atraumatic  HEENT: normal conjunctiva, oral mucosa moist, uvula midline, no tonsilar exudates, neck supple, no JVD  CARDIAC: regular rate and rhythm, normal S1S2, no appreciable murmurs, 2+ pulses in UE/LE b/l  PULM: normal breath sounds, clear to ascultation bilaterally, no rales, rhonchi, wheezing  GI: abdomen nondistended, soft, nontender, no guarding, rebound tenderness  : no CVA tenderness b/l, no suprapubic tenderness  NEURO: normal speech, PERRL, EOMI, normal gait, AAOx3  MSK: no peripheral edema, no calf tenderness b/l  SKIN: well-perfused, extremities warm, no visible rashes  PSYCH: appropriate mood and affect

## 2022-12-06 NOTE — ED PROVIDER NOTE - NSFOLLOWUPCLINICS_GEN_ALL_ED_FT
Gastroenterology at [facility]  Gastroenterology  [address] [place] [address]  Phone: [phone]  Fax:   Follow Up Time: Routine    [facility]  Cardiology  [address] [place] [address]  Phone: [phone]  Fax:   Follow Up Time: Routine

## 2022-12-06 NOTE — ED PROVIDER NOTE - NS ED ROS FT
REVIEW OF SYSTEMS:     CONSTITUTIONAL: No fever, weight loss, + fatigue  EYES: No eye pain, visual disturbances, or discharge  ENMT:  No difficulty hearing, tinnitus, vertigo; No sinus or throat pain  NECK: No pain or stiffness  RESPIRATORY: No cough, wheezing, chills or hemoptysis; No shortness of breath  CARDIOVASCULAR: No chest pain, palpitations, dizziness, or leg swelling  GASTROINTESTINAL: + abdominal or epigastric pain. No nausea, vomiting, or hematemesis; No diarrhea, + constipation. No melena or hematochezia.  GENITOURINARY: No dysuria, frequency, hematuria, or incontinence  NEUROLOGICAL: No headaches, memory loss, loss of strength, numbness, or tremors  SKIN: No itching, burning, rashes, or lesions

## 2022-12-06 NOTE — ED PROVIDER NOTE - CLINICAL SUMMARY MEDICAL DECISION MAKING FREE TEXT BOX
46yM no pmh, pshx of hernia repair last year presenting for dizziness, lightheadedness last night, constipation sxs for a year now but last BM this morning. Ddx includes orthostatic hypotension vs low fiber intake, electrolyte abnormalities. Will check basic labs, orthostatics, xray abdomen to assess for stool burden. Dispo some w/pcp f/u

## 2023-05-07 ENCOUNTER — EMERGENCY (EMERGENCY)
Facility: HOSPITAL | Age: 48
LOS: 1 days | Discharge: HOME | End: 2023-05-07
Attending: EMERGENCY MEDICINE | Admitting: EMERGENCY MEDICINE
Payer: COMMERCIAL

## 2023-05-07 VITALS
HEART RATE: 81 BPM | SYSTOLIC BLOOD PRESSURE: 154 MMHG | DIASTOLIC BLOOD PRESSURE: 88 MMHG | TEMPERATURE: 99 F | RESPIRATION RATE: 16 BRPM | OXYGEN SATURATION: 100 %

## 2023-05-07 VITALS
RESPIRATION RATE: 18 BRPM | TEMPERATURE: 98 F | HEART RATE: 63 BPM | OXYGEN SATURATION: 100 % | SYSTOLIC BLOOD PRESSURE: 140 MMHG | DIASTOLIC BLOOD PRESSURE: 78 MMHG

## 2023-05-07 DIAGNOSIS — T14.8XXA OTHER INJURY OF UNSPECIFIED BODY REGION, INITIAL ENCOUNTER: Chronic | ICD-10-CM

## 2023-05-07 LAB
ALBUMIN SERPL ELPH-MCNC: 4.9 G/DL — SIGNIFICANT CHANGE UP (ref 3.3–5)
ALP SERPL-CCNC: 65 U/L — SIGNIFICANT CHANGE UP (ref 40–120)
ALT FLD-CCNC: 24 U/L — SIGNIFICANT CHANGE UP (ref 4–41)
ANION GAP SERPL CALC-SCNC: 15 MMOL/L — HIGH (ref 7–14)
AST SERPL-CCNC: 37 U/L — SIGNIFICANT CHANGE UP (ref 4–40)
BASOPHILS # BLD AUTO: 0.02 K/UL — SIGNIFICANT CHANGE UP (ref 0–0.2)
BASOPHILS NFR BLD AUTO: 0.2 % — SIGNIFICANT CHANGE UP (ref 0–2)
BILIRUB SERPL-MCNC: 0.4 MG/DL — SIGNIFICANT CHANGE UP (ref 0.2–1.2)
BUN SERPL-MCNC: 13 MG/DL — SIGNIFICANT CHANGE UP (ref 7–23)
CALCIUM SERPL-MCNC: 9.7 MG/DL — SIGNIFICANT CHANGE UP (ref 8.4–10.5)
CHLORIDE SERPL-SCNC: 101 MMOL/L — SIGNIFICANT CHANGE UP (ref 98–107)
CO2 SERPL-SCNC: 21 MMOL/L — LOW (ref 22–31)
CREAT SERPL-MCNC: 0.72 MG/DL — SIGNIFICANT CHANGE UP (ref 0.5–1.3)
EGFR: 115 ML/MIN/1.73M2 — SIGNIFICANT CHANGE UP
EOSINOPHIL # BLD AUTO: 0.01 K/UL — SIGNIFICANT CHANGE UP (ref 0–0.5)
EOSINOPHIL NFR BLD AUTO: 0.1 % — SIGNIFICANT CHANGE UP (ref 0–6)
GLUCOSE SERPL-MCNC: 95 MG/DL — SIGNIFICANT CHANGE UP (ref 70–99)
HCT VFR BLD CALC: 43.9 % — SIGNIFICANT CHANGE UP (ref 39–50)
HGB BLD-MCNC: 14.7 G/DL — SIGNIFICANT CHANGE UP (ref 13–17)
IANC: 7.67 K/UL — HIGH (ref 1.8–7.4)
IMM GRANULOCYTES NFR BLD AUTO: 0.3 % — SIGNIFICANT CHANGE UP (ref 0–0.9)
LYMPHOCYTES # BLD AUTO: 1.28 K/UL — SIGNIFICANT CHANGE UP (ref 1–3.3)
LYMPHOCYTES # BLD AUTO: 13.8 % — SIGNIFICANT CHANGE UP (ref 13–44)
MCHC RBC-ENTMCNC: 30 PG — SIGNIFICANT CHANGE UP (ref 27–34)
MCHC RBC-ENTMCNC: 33.5 GM/DL — SIGNIFICANT CHANGE UP (ref 32–36)
MCV RBC AUTO: 89.6 FL — SIGNIFICANT CHANGE UP (ref 80–100)
MONOCYTES # BLD AUTO: 0.25 K/UL — SIGNIFICANT CHANGE UP (ref 0–0.9)
MONOCYTES NFR BLD AUTO: 2.7 % — SIGNIFICANT CHANGE UP (ref 2–14)
NEUTROPHILS # BLD AUTO: 7.67 K/UL — HIGH (ref 1.8–7.4)
NEUTROPHILS NFR BLD AUTO: 82.9 % — HIGH (ref 43–77)
NRBC # BLD: 0 /100 WBCS — SIGNIFICANT CHANGE UP (ref 0–0)
NRBC # FLD: 0 K/UL — SIGNIFICANT CHANGE UP (ref 0–0)
PLATELET # BLD AUTO: 271 K/UL — SIGNIFICANT CHANGE UP (ref 150–400)
POTASSIUM SERPL-MCNC: 5 MMOL/L — SIGNIFICANT CHANGE UP (ref 3.5–5.3)
POTASSIUM SERPL-SCNC: 5 MMOL/L — SIGNIFICANT CHANGE UP (ref 3.5–5.3)
PROT SERPL-MCNC: 8.2 G/DL — SIGNIFICANT CHANGE UP (ref 6–8.3)
RBC # BLD: 4.9 M/UL — SIGNIFICANT CHANGE UP (ref 4.2–5.8)
RBC # FLD: 13.1 % — SIGNIFICANT CHANGE UP (ref 10.3–14.5)
SODIUM SERPL-SCNC: 137 MMOL/L — SIGNIFICANT CHANGE UP (ref 135–145)
TROPONIN T, HIGH SENSITIVITY RESULT: <6 NG/L — SIGNIFICANT CHANGE UP
WBC # BLD: 9.26 K/UL — SIGNIFICANT CHANGE UP (ref 3.8–10.5)
WBC # FLD AUTO: 9.26 K/UL — SIGNIFICANT CHANGE UP (ref 3.8–10.5)

## 2023-05-07 PROCEDURE — 73564 X-RAY EXAM KNEE 4 OR MORE: CPT | Mod: 26,50

## 2023-05-07 PROCEDURE — 99285 EMERGENCY DEPT VISIT HI MDM: CPT

## 2023-05-07 PROCEDURE — 93010 ELECTROCARDIOGRAM REPORT: CPT

## 2023-05-07 PROCEDURE — 71260 CT THORAX DX C+: CPT | Mod: 26,MA

## 2023-05-07 PROCEDURE — 71046 X-RAY EXAM CHEST 2 VIEWS: CPT | Mod: 26

## 2023-05-07 RX ORDER — IBUPROFEN 200 MG
600 TABLET ORAL ONCE
Refills: 0 | Status: COMPLETED | OUTPATIENT
Start: 2023-05-07 | End: 2023-05-07

## 2023-05-07 RX ORDER — ACETAMINOPHEN 500 MG
650 TABLET ORAL ONCE
Refills: 0 | Status: COMPLETED | OUTPATIENT
Start: 2023-05-07 | End: 2023-05-07

## 2023-05-07 RX ADMIN — Medication 650 MILLIGRAM(S): at 09:56

## 2023-05-07 RX ADMIN — Medication 600 MILLIGRAM(S): at 09:56

## 2023-05-07 RX ADMIN — Medication 650 MILLIGRAM(S): at 10:26

## 2023-05-07 RX ADMIN — Medication 600 MILLIGRAM(S): at 10:26

## 2023-05-07 NOTE — ED PROVIDER NOTE - NSFOLLOWUPINSTRUCTIONS_ED_ALL_ED_FT
Follow-up with your primary care doctor within 1 week.  Take ibuprofen 600 mg every 6-8 hours as needed for pain, take with food.  – You can also take Tylenol 650 mg every 6 hours as needed for pain.  Return to the ER with any worsening or concerning symptoms, increased pain, chest pain, shortness of breath, palpitations, feeling faint or any other concerns. Follow-up with your primary care doctor within 1 week, show copies of your CT scan and xray  Follow up with an orthopedic doctor if knee pain persists, show copies of your xray results, clinic information attached please call to make an appointment   Take ibuprofen 600 mg every 6-8 hours as needed for pain, take with food.  – You can also take Tylenol 650 mg every 6 hours as needed for pain.  Return to the ER with any worsening or concerning symptoms, increased pain, chest pain, shortness of breath, palpitations, feeling faint or any other concerns.
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
## 2023-05-07 NOTE — ED PROVIDER NOTE - PROGRESS NOTE DETAILS
MALKA Rojas: Pt reassessed, reports complete resolution of chest soreness, still with mild b/l knee soreness, pending xray results. MALKA Rojas: xray showing right pleural base reaction, spoke with radiology, new since most recent CXR, possible effusion. Discussed with ED attg, plan for cbc/cmp/trop and CT chest with IV contrast to further evaluate. Pt agreeable with plan MALKA Rojas: CT chest resulted showing findings compatible with the sequela of remote inflammation with mild foci of pleural-parenchymal scarring.  Patient reassessed reports he is feeling well at present Labs reviewed troponin is negative.  X-rays of knees with no significant fracture dislocations bilaterally does show age-indeterminate irregularity of proximal right tibial diaphysis which she recommend orthopedic follow-up for.  Patient will return to the ER with any worsening or concerning symptoms. Discharge discussed with ed attg. MALKA Rojas: CT chest resulted showing findings compatible with the sequela of remote inflammation with mild foci of pleural-parenchymal scarring.  Patient reassessed reports he is feeling well at present Labs reviewed troponin is negative.  X-rays of knees with no significant fracture dislocations bilaterally does show age-indeterminate irregularity of proximal right tibial diaphysis (not his region of pain) which  recommend orthopedic follow-up for. All results d/w patient and copies given. Patient will return to the ER with any worsening or concerning symptoms. Discharge discussed with ed attg.
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
## 2023-05-07 NOTE — ED PROVIDER NOTE - NS ED ATTENDING STATEMENT MOD
This was a shared visit with the NADJA. I reviewed and verified the documentation and independently performed the documented:

## 2023-05-07 NOTE — ED PROVIDER NOTE - PATIENT PORTAL LINK FT
You can access the FollowMyHealth Patient Portal offered by [facility] by registering at the following website: [email or web address]. By joining [facility]’s FollowMyHealth portal, you will also be able to view your health information using other applications (apps) compatible with our system.

## 2023-05-07 NOTE — ED PROVIDER NOTE - PHYSICAL EXAMINATION
mild tenderness to left upper/lateral chest near axilla, no ecchymoses  no seatbelt sign  MSK: no c-spine or midline spinal tenderness  FROM of b/l knees, no obvious swelling or deformity, no bony tenderness
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
## 2024-12-02 NOTE — ED ADULT NURSE NOTE - NSIMPLEMENTINTERV_GEN_ALL_ED
abdominal pain
Implemented All Universal Safety Interventions:  [facility] to call system. Call bell, personal items and telephone within reach. Instruct patient to call for assistance. Room bathroom lighting operational. Non-slip footwear when patient is off stretcher. Physically safe environment: no spills, clutter or unnecessary equipment. Stretcher in lowest position, wheels locked, appropriate side rails in place.